# Patient Record
Sex: MALE | Race: WHITE | NOT HISPANIC OR LATINO | Employment: OTHER | URBAN - METROPOLITAN AREA
[De-identification: names, ages, dates, MRNs, and addresses within clinical notes are randomized per-mention and may not be internally consistent; named-entity substitution may affect disease eponyms.]

---

## 2021-04-22 ENCOUNTER — HOSPITAL ENCOUNTER (OUTPATIENT)
Dept: INTERNAL MEDICINE | Facility: CLINIC | Age: 56
Discharge: HOME | End: 2021-04-22
Attending: STUDENT IN AN ORGANIZED HEALTH CARE EDUCATION/TRAINING PROGRAM

## 2021-04-22 ENCOUNTER — OFFICE VISIT CONVERTED (OUTPATIENT)
Dept: INTERNAL MEDICINE | Facility: CLINIC | Age: 56
End: 2021-04-22
Attending: STUDENT IN AN ORGANIZED HEALTH CARE EDUCATION/TRAINING PROGRAM

## 2021-04-22 ENCOUNTER — CONVERSION ENCOUNTER (OUTPATIENT)
Dept: INTERNAL MEDICINE | Facility: CLINIC | Age: 56
End: 2021-04-22

## 2021-04-22 LAB
ALBUMIN SERPL-MCNC: 4.3 G/DL (ref 3.5–5)
ALBUMIN/GLOB SERPL: 1.3 {RATIO} (ref 1.4–2.6)
ALP SERPL-CCNC: 73 U/L (ref 56–119)
ALT SERPL-CCNC: 28 U/L (ref 10–40)
ANION GAP SERPL CALC-SCNC: 15 MMOL/L (ref 8–19)
AST SERPL-CCNC: 32 U/L (ref 15–50)
BASOPHILS # BLD AUTO: 0.04 10*3/UL (ref 0–0.2)
BASOPHILS NFR BLD AUTO: 0.7 % (ref 0–3)
BILIRUB SERPL-MCNC: 0.32 MG/DL (ref 0.2–1.3)
BUN SERPL-MCNC: 18 MG/DL (ref 5–25)
BUN/CREAT SERPL: 19 {RATIO} (ref 6–20)
CALCIUM SERPL-MCNC: 9.5 MG/DL (ref 8.7–10.4)
CHLORIDE SERPL-SCNC: 102 MMOL/L (ref 99–111)
CHOLEST SERPL-MCNC: 187 MG/DL (ref 107–200)
CHOLEST/HDLC SERPL: 4.2 {RATIO} (ref 3–6)
CONV ABS IMM GRAN: 0.01 10*3/UL (ref 0–0.2)
CONV CO2: 26 MMOL/L (ref 22–32)
CONV HIV-1/ HIV-2: NONREACTIVE
CONV IMMATURE GRAN: 0.2 % (ref 0–1.8)
CONV TOTAL PROTEIN: 7.7 G/DL (ref 6.3–8.2)
CREAT UR-MCNC: 0.95 MG/DL (ref 0.7–1.2)
DEPRECATED RDW RBC AUTO: 40.4 FL (ref 35.1–43.9)
EOSINOPHIL # BLD AUTO: 0.14 10*3/UL (ref 0–0.7)
EOSINOPHIL # BLD AUTO: 2.5 % (ref 0–7)
ERYTHROCYTE [DISTWIDTH] IN BLOOD BY AUTOMATED COUNT: 12.4 % (ref 11.6–14.4)
GFR SERPLBLD BASED ON 1.73 SQ M-ARVRAT: >60 ML/MIN/{1.73_M2}
GLOBULIN UR ELPH-MCNC: 3.4 G/DL (ref 2–3.5)
GLUCOSE SERPL-MCNC: 84 MG/DL (ref 70–99)
HCT VFR BLD AUTO: 45.6 % (ref 42–52)
HDLC SERPL-MCNC: 45 MG/DL (ref 40–60)
HGB BLD-MCNC: 15.3 G/DL (ref 14–18)
LDLC SERPL CALC-MCNC: 105 MG/DL (ref 70–100)
LYMPHOCYTES # BLD AUTO: 2.01 10*3/UL (ref 1–5)
LYMPHOCYTES NFR BLD AUTO: 35.6 % (ref 20–45)
MCH RBC QN AUTO: 30.5 PG (ref 27–31)
MCHC RBC AUTO-ENTMCNC: 33.6 G/DL (ref 33–37)
MCV RBC AUTO: 91 FL (ref 80–96)
MONOCYTES # BLD AUTO: 0.66 10*3/UL (ref 0.2–1.2)
MONOCYTES NFR BLD AUTO: 11.7 % (ref 3–10)
NEUTROPHILS # BLD AUTO: 2.78 10*3/UL (ref 2–8)
NEUTROPHILS NFR BLD AUTO: 49.3 % (ref 30–85)
NRBC CBCN: 0 % (ref 0–0.7)
OSMOLALITY SERPL CALC.SUM OF ELEC: 289 MOSM/KG (ref 273–304)
PLATELET # BLD AUTO: 255 10*3/UL (ref 130–400)
PMV BLD AUTO: 10.5 FL (ref 9.4–12.4)
POTASSIUM SERPL-SCNC: 4.4 MMOL/L (ref 3.5–5.3)
RBC # BLD AUTO: 5.01 10*6/UL (ref 4.7–6.1)
SODIUM SERPL-SCNC: 139 MMOL/L (ref 135–147)
TRIGL SERPL-MCNC: 186 MG/DL (ref 40–150)
TSH SERPL-ACNC: 2.71 M[IU]/L (ref 0.27–4.2)
VLDLC SERPL-MCNC: 37 MG/DL (ref 5–37)
WBC # BLD AUTO: 5.64 10*3/UL (ref 4.8–10.8)

## 2021-05-11 NOTE — H&P
"   History and Physical      Patient Name: Nick Resendez   Patient ID: [id number]   Sex: Male   YOB: 1965    Primary Care Provider: Santiago Blanchard MD    Visit Date: April 22, 2021    Provider: Santiago Blanchard MD   Location: [facility]   Location Address: [address] [place] [address]; [address]  [place], [place]  [address]   Location Phone: [phone]          Chief Complaint  · EST CARE  · \"overall health\"  · \"skin condition\"      History Of Present Illness  Nick Resendez is a 55 year old /White male who presents for evaluation and treatment of:      Here to establish care.  Previous PCP- Dr. Augustin Magallon ([facility] in [facility])    History of skin condition, for which he follows with a Dr. Sharma of dermatology in [place].  On doxycycline and an unknown topical medicine (starting with a C).  He reports he has been diagnosed with folliculitis.    Medical history otherwise notable for tobacco abuse.  Smokes 1 PPD (started age 18).  He reports he did quit smoking for 16 years, but began again a few years ago.  He has experimented with vaped marijuana, and occasionally consumes alcohol.  He denies other illicits.    Medical history otherwise notable for missing part of right middle finger (childhood accident), hearing loss (wears a hearing aid, left ear), and a heart valve problem (for which he previously saw Dr. Carrion).      No known history of MI, stents, CVA or cancer    Health Maintenance:  Immunizations: has not had flu or COVID immunizations, has not had shingles shot (has had chickenpox as a child)  Colon: has never had colon cancer screening         Allergy List    Allergies Reconciled  Review of Systems  · Constitutional  o Denies  o : fever, fatigue, weight loss, weight gain  · Cardiovascular  o Denies  o : lower extremity edema, claudication, chest pressure, palpitations  · Respiratory  o Denies  o : shortness of breath, wheezing, " "cough, hemoptysis, dyspnea on exertion  · Gastrointestinal  o Denies  o : nausea, vomiting, diarrhea, constipation, abdominal pain      Vitals  Date Time BP Position Site L\R Cuff Size HR RR TEMP (F) WT  HT  BMI kg/m2 BSA m2 O2 Sat FR L/min FiO2 HC       04/22/2021 04:09 /85 Sitting    67 - R   200lbs 0oz 5'  11\" 27.89 2.13 98 %  21%          Physical Examination  · Constitutional  o Appearance  o : no acute distress, well-nourished  · Head and Face  o Head  o :   § Inspection  § : atraumatic, normocephalic  · Eyes  o Eyes  o : extraocular movements intact, no scleral icterus, no conjunctival injection  · Ears, Nose, Mouth and Throat  o Ears  o :   § External Ears  § : normal  § Otoscopic Examination  § : tympanic membrane appearance within normal limits bilaterally, superficial abrasion noted right ear canal  o Nose  o :   § Intranasal Exam  § : nares patent  o Oral Cavity  o :   § Oral Mucosa  § : moist mucous membranes  · Respiratory  o Respiratory Effort  o : breathing comfortably, symmetric chest rise  o Auscultation of Lungs  o : clear to asculatation bilaterally, no wheezes, rales, or rhonchii  · Cardiovascular  o Heart  o :   § Auscultation of Heart  § : regular rate and rhythm, no murmurs, rubs, or gallops  o Peripheral Vascular System  o :   § Extremities  § : no edema  · Gastrointestinal  o Abdominal Examination  o :   § Abdomen  § : non-distended, non-tender  · Skin and Subcutaneous Tissue  o General Inspection  o : superficial ulcerations and an excoriation noted on scalp. Dry skin noted on forearms  · Neurologic  o Mental Status Examination  o :   § Orientation  § : grossly oriented to person, place and time  · Psychiatric  o General  o : normal mood and affect     MSK: missing distal aspect of right middle finger           Assessment  · Screening for colon cancer     V76.51/Z12.11  · Screening for HIV (human immunodeficiency virus)     V73.89/Z11.4  · Annual physical exam     V70.0/Z00.00  -labs " today  -will order colonoscopy  -will request records from derm and previous PCP  -will start nicotine gum plus patches for smoking cessation  -discussed the importance of a quit date, noting usual patterns and triggers so as to form coping strategies ahead of time  -counseled that doxycycline can cause photosensitivity (wear a hat, use sunscreen when outdoors) and the importance of swallowing with plenty of liquids  · Nicotine dependence     305.1/F17.200  · Tobacco abuse counseling       Tobacco abuse counseling     V65.42/Z71.6  · Rash     782.1/R21  · Hearing loss     389.9/H91.90  -hearing aid, left ear  · Heart valve problem     424.90/I38  -no murmur on exam today, hemodynamically stable  -will request records from his cardiologist    Problems Reconciled  Plan  · Orders  o COLONOSCOPY REFERRAL (COLON) - V76.51/Z12.11 - 04/22/2021  o HIV Screen by EIA [facility] ([id number], ) - V73.89/Z11.4 - 04/22/2021  o Physical, Primary Care Panel (CBC, CMP, Lipid, TSH) [facility] ([id number], [id number], [id number], [id number]) - V70.0/Z00.00 - 04/22/2021  o Smoking cessation counseling, 3-10 minutes [facility] ([id number]) - 305.1/F17.200 - 04/22/2021  o ACO-17: Screened for tobacco use AND received tobacco cessation intervention (4004F) - 305.1/F17.200 - 04/22/2021  o ACO-17: Screened for tobacco use AND received tobacco cessation intervention (4004F) - V65.42/Z71.6 - 04/22/2021  o ACO-39: Current medications updated and reviewed (, 1159F) - V70.0/Z00.00, V76.51/Z12.11, V73.89/Z11.4, 305.1/F17.200 - 04/22/2021  · Medications  o Medications have been Reconciled  o Transition of Care or Provider Policy  · Instructions  o CDC recommends that everyone between 13 and 64 get tested for HIV at least once as part of routine healthcare.  o Reviewed health maintenance flowsheet and updated information. Orders were placed and/or patient's response was documented.  o *Form of nicotine being used:   o Patient was strongly encouraged to discontinue use of any nicotine  containing product or minimize the use of the product.  o Tobacco and smoking cessation counseling for more than 3 minutes was completed.  o Patient was educated/instructed on their diagnosis, treatment and medications prior to discharge from the clinic today.  · Disposition  o Return Visit Request in/on 3 months +/- 2 days ([id number]).            Electronically Signed by: Santiago Blanchard MD -Author on April 22, 2021 05:23:19 PM

## 2021-05-14 VITALS
HEIGHT: 71 IN | SYSTOLIC BLOOD PRESSURE: 144 MMHG | WEIGHT: 200 LBS | BODY MASS INDEX: 28 KG/M2 | OXYGEN SATURATION: 98 % | HEART RATE: 67 BPM | DIASTOLIC BLOOD PRESSURE: 85 MMHG

## 2021-06-10 ENCOUNTER — TELEPHONE (OUTPATIENT)
Dept: INTERNAL MEDICINE | Facility: CLINIC | Age: 56
End: 2021-06-10

## 2021-06-16 DIAGNOSIS — R52 PAIN: Primary | ICD-10-CM

## 2021-06-25 ENCOUNTER — TELEPHONE (OUTPATIENT)
Dept: INTERNAL MEDICINE | Facility: CLINIC | Age: 56
End: 2021-06-25

## 2021-06-25 NOTE — TELEPHONE ENCOUNTER
Attempted phone contact with Mr. Resendez as he is requesting referral to pain management.  No answer, no voice mail set up.    Attempting to get in touch to inquire as to what sorts of chronic pain needs he is having.

## 2022-03-18 ENCOUNTER — TELEPHONE (OUTPATIENT)
Dept: GASTROENTEROLOGY | Facility: CLINIC | Age: 57
End: 2022-03-18

## 2022-03-18 NOTE — TELEPHONE ENCOUNTER
Dali from [facility] called at 10:14 checking on a referral for an appointment at the  [facility]. She sent over the referral. She would like a call back at [phone].

## 2022-03-22 NOTE — TELEPHONE ENCOUNTER
I left a message on Dali's voicemail letting her know that I have reached out to the patient twice but he has not yet been scheduled.

## 2022-05-06 ENCOUNTER — TELEPHONE (OUTPATIENT)
Dept: GASTROENTEROLOGY | Facility: CLINIC | Age: 57
End: 2022-05-06

## 2022-05-06 ENCOUNTER — OFFICE VISIT (OUTPATIENT)
Dept: GASTROENTEROLOGY | Facility: HOSPITAL | Age: 57
End: 2022-05-06

## 2022-05-06 VITALS
BODY MASS INDEX: 27.3 KG/M2 | SYSTOLIC BLOOD PRESSURE: 156 MMHG | HEART RATE: 95 BPM | DIASTOLIC BLOOD PRESSURE: 89 MMHG | WEIGHT: 195 LBS | HEIGHT: 71 IN

## 2022-05-06 DIAGNOSIS — Z11.4 ENCOUNTER FOR SCREENING FOR HUMAN IMMUNODEFICIENCY VIRUS (HIV): ICD-10-CM

## 2022-05-06 DIAGNOSIS — Z11.59 ENCOUNTER FOR SCREENING FOR OTHER VIRAL DISEASES: ICD-10-CM

## 2022-05-06 DIAGNOSIS — Z03.89 ENCOUNTER FOR OBSERVATION FOR OTHER SUSPECTED DISEASES AND CONDITIONS RULED OUT: ICD-10-CM

## 2022-05-06 DIAGNOSIS — B18.2 CHRONIC HEPATITIS C WITHOUT HEPATIC COMA: Primary | ICD-10-CM

## 2022-05-06 LAB
ALBUMIN SERPL-MCNC: 5.1 G/DL (ref 3.5–5.2)
ALBUMIN/GLOB SERPL: 1.6 G/DL
ALP SERPL-CCNC: 88 U/L (ref 39–117)
ALT SERPL W P-5'-P-CCNC: 28 U/L (ref 1–41)
ANION GAP SERPL CALCULATED.3IONS-SCNC: 14.2 MMOL/L (ref 5–15)
AST SERPL-CCNC: 31 U/L (ref 1–40)
BASOPHILS # BLD AUTO: 0.06 10*3/MM3 (ref 0–0.2)
BASOPHILS NFR BLD AUTO: 0.8 % (ref 0–1.5)
BILIRUB SERPL-MCNC: 0.3 MG/DL (ref 0–1.2)
BUN SERPL-MCNC: 14 MG/DL (ref 6–20)
BUN/CREAT SERPL: 12.8 (ref 7–25)
CALCIUM SPEC-SCNC: 10.1 MG/DL (ref 8.6–10.5)
CHLORIDE SERPL-SCNC: 99 MMOL/L (ref 98–107)
CO2 SERPL-SCNC: 23.8 MMOL/L (ref 22–29)
CREAT SERPL-MCNC: 1.09 MG/DL (ref 0.76–1.27)
DEPRECATED RDW RBC AUTO: 38.9 FL (ref 37–54)
EGFRCR SERPLBLD CKD-EPI 2021: 80.7 ML/MIN/1.73
EOSINOPHIL # BLD AUTO: 0.16 10*3/MM3 (ref 0–0.4)
EOSINOPHIL NFR BLD AUTO: 2.1 % (ref 0.3–6.2)
ERYTHROCYTE [DISTWIDTH] IN BLOOD BY AUTOMATED COUNT: 12.1 % (ref 12.3–15.4)
ETHANOL BLD-MCNC: <10 MG/DL (ref 0–10)
ETHANOL UR QL: <0.01 %
GLOBULIN UR ELPH-MCNC: 3.2 GM/DL
GLUCOSE SERPL-MCNC: 83 MG/DL (ref 65–99)
HAV IGM SERPL QL IA: ABNORMAL
HBV CORE IGM SERPL QL IA: ABNORMAL
HBV SURFACE AB SER RIA-ACNC: NORMAL
HBV SURFACE AG SERPL QL IA: ABNORMAL
HCT VFR BLD AUTO: 47.6 % (ref 37.5–51)
HCV AB SER DONR QL: REACTIVE
HGB BLD-MCNC: 16.5 G/DL (ref 13–17.7)
HIV1+2 AB SER QL: NORMAL
IMM GRANULOCYTES # BLD AUTO: 0.02 10*3/MM3 (ref 0–0.05)
IMM GRANULOCYTES NFR BLD AUTO: 0.3 % (ref 0–0.5)
INR PPP: 0.86 (ref 0.86–1.15)
LYMPHOCYTES # BLD AUTO: 2.58 10*3/MM3 (ref 0.7–3.1)
LYMPHOCYTES NFR BLD AUTO: 34.5 % (ref 19.6–45.3)
MCH RBC QN AUTO: 30.7 PG (ref 26.6–33)
MCHC RBC AUTO-ENTMCNC: 34.7 G/DL (ref 31.5–35.7)
MCV RBC AUTO: 88.5 FL (ref 79–97)
MONOCYTES # BLD AUTO: 0.67 10*3/MM3 (ref 0.1–0.9)
MONOCYTES NFR BLD AUTO: 9 % (ref 5–12)
NEUTROPHILS NFR BLD AUTO: 3.99 10*3/MM3 (ref 1.7–7)
NEUTROPHILS NFR BLD AUTO: 53.3 % (ref 42.7–76)
NRBC BLD AUTO-RTO: 0 /100 WBC (ref 0–0.2)
PLATELET # BLD AUTO: 273 10*3/MM3 (ref 140–450)
PMV BLD AUTO: 10.7 FL (ref 6–12)
POTASSIUM SERPL-SCNC: 4 MMOL/L (ref 3.5–5.2)
PROT SERPL-MCNC: 8.3 G/DL (ref 6–8.5)
PROTHROMBIN TIME: 11.8 SECONDS (ref 11.8–14.9)
RBC # BLD AUTO: 5.38 10*6/MM3 (ref 4.14–5.8)
SODIUM SERPL-SCNC: 137 MMOL/L (ref 136–145)
WBC NRBC COR # BLD: 7.48 10*3/MM3 (ref 3.4–10.8)

## 2022-05-06 PROCEDURE — 80307 DRUG TEST PRSMV CHEM ANLYZR: CPT | Performed by: NURSE PRACTITIONER

## 2022-05-06 PROCEDURE — 82077 ASSAY SPEC XCP UR&BREATH IA: CPT | Performed by: NURSE PRACTITIONER

## 2022-05-06 PROCEDURE — 87522 HEPATITIS C REVRS TRNSCRPJ: CPT | Performed by: NURSE PRACTITIONER

## 2022-05-06 PROCEDURE — 99204 OFFICE O/P NEW MOD 45 MIN: CPT | Performed by: NURSE PRACTITIONER

## 2022-05-06 PROCEDURE — 86704 HEP B CORE ANTIBODY TOTAL: CPT | Performed by: NURSE PRACTITIONER

## 2022-05-06 PROCEDURE — 85610 PROTHROMBIN TIME: CPT | Performed by: NURSE PRACTITIONER

## 2022-05-06 PROCEDURE — 91200 LIVER ELASTOGRAPHY: CPT | Performed by: NURSE PRACTITIONER

## 2022-05-06 PROCEDURE — 80074 ACUTE HEPATITIS PANEL: CPT | Performed by: NURSE PRACTITIONER

## 2022-05-06 PROCEDURE — G0480 DRUG TEST DEF 1-7 CLASSES: HCPCS | Performed by: NURSE PRACTITIONER

## 2022-05-06 PROCEDURE — G0463 HOSPITAL OUTPT CLINIC VISIT: HCPCS | Performed by: NURSE PRACTITIONER

## 2022-05-06 PROCEDURE — 87517 HEPATITIS B DNA QUANT: CPT | Performed by: NURSE PRACTITIONER

## 2022-05-06 PROCEDURE — 80053 COMPREHEN METABOLIC PANEL: CPT | Performed by: NURSE PRACTITIONER

## 2022-05-06 PROCEDURE — 87902 NFCT AGT GNTYP ALYS HEP C: CPT | Performed by: NURSE PRACTITIONER

## 2022-05-06 PROCEDURE — 85025 COMPLETE CBC W/AUTO DIFF WBC: CPT | Performed by: NURSE PRACTITIONER

## 2022-05-06 PROCEDURE — G0432 EIA HIV-1/HIV-2 SCREEN: HCPCS | Performed by: NURSE PRACTITIONER

## 2022-05-06 PROCEDURE — 86706 HEP B SURFACE ANTIBODY: CPT | Performed by: NURSE PRACTITIONER

## 2022-05-06 RX ORDER — CETIRIZINE HYDROCHLORIDE 10 MG/1
10 TABLET ORAL DAILY
COMMUNITY

## 2022-05-06 RX ORDER — BIOTIN 1 MG
1000 TABLET ORAL 3 TIMES DAILY
COMMUNITY

## 2022-05-06 RX ORDER — KETOCONAZOLE 20 MG/G
CREAM TOPICAL
COMMUNITY
Start: 2022-04-12

## 2022-05-06 RX ORDER — LISINOPRIL 40 MG/1
40 TABLET ORAL DAILY
COMMUNITY
Start: 2022-04-30

## 2022-05-06 RX ORDER — DOXYCYCLINE 50 MG/1
CAPSULE ORAL
COMMUNITY
End: 2022-12-20

## 2022-05-06 RX ORDER — MELOXICAM 15 MG/1
TABLET ORAL
COMMUNITY
Start: 2022-04-22

## 2022-05-06 RX ORDER — ERGOCALCIFEROL 1.25 MG/1
50000 CAPSULE ORAL
COMMUNITY
Start: 2022-03-11 | End: 2023-03-12

## 2022-05-06 RX ORDER — FUROSEMIDE 20 MG/1
20 TABLET ORAL DAILY
COMMUNITY
Start: 2022-04-30

## 2022-05-06 RX ORDER — FLUOXETINE HYDROCHLORIDE 20 MG/1
CAPSULE ORAL
COMMUNITY
Start: 2022-04-30 | End: 2022-12-20

## 2022-05-06 RX ORDER — UBIDECARENONE 75 MG
100 CAPSULE ORAL DAILY
COMMUNITY

## 2022-05-06 NOTE — PROGRESS NOTES
Chief Complaint  Hepatitis C    Subjective            Nick Resendez presents to [facility] for evaluation and treatment of chronic HCV.      HPI    He reports recent diagnosis of Hepatitis C.  Admits having one unprofessional tattoo.  Reports a history of illicit drug use.  States that he last used about one year ago.      Allergies   Allergen Reactions   • Azithromycin Nausea Only   • Sulfa Antibiotics Nausea Only       Current Outpatient Medications:   •  ascorbic acid (VITAMIN C) 1000 MG tablet, Take 1,000 mg by mouth Daily., Disp: , Rfl:   •  Biotin 1000 MCG tablet, Take 1,000 mcg by mouth 3 (Three) Times a Day., Disp: , Rfl:   •  cetirizine (zyrTEC) 10 MG tablet, Take 10 mg by mouth Daily., Disp: , Rfl:   •  doxycycline (MONODOX) 50 MG capsule, doxycycline monohydrate 50 mg oral capsule take 2 capsules (100 mg) by oral route once daily   Active, Disp: , Rfl:   •  ergocalciferol (ERGOCALCIFEROL) 1.25 MG (63857 UT) capsule, Take 50,000 Units by mouth., Disp: , Rfl:   •  FLUoxetine (PROzac) 20 MG capsule, , Disp: , Rfl:   •  furosemide (LASIX) 20 MG tablet, , Disp: , Rfl:   •  ketoconazole (NIZORAL) 2 % cream, SMARTSI Application Topical 1 to 2 Times Daily, Disp: , Rfl:   •  lisinopril (PRINIVIL,ZESTRIL) 40 MG tablet, , Disp: , Rfl:   •  meloxicam (MOBIC) 15 MG tablet, , Disp: , Rfl:   •  nicotine polacrilex (NICORETTE) 2 MG gum, nicotine (polacrilex) 2 mg buccal gum chew 1 piece of gum (2 mg) by oral route every 2 hours as needed and as directed for 30 days 2021  Active, Disp: , Rfl:   •  vitamin B-12 (CYANOCOBALAMIN) 100 MCG tablet, Take 100 mcg by mouth Daily., Disp: , Rfl:   History reviewed. No pertinent past medical history.  History reviewed. No pertinent surgical history.  Social History     Socioeconomic History   • Marital status:    Tobacco Use   • Smoking status: Current Every Day Smoker   • Tobacco comment: 12 per day       Review of Systems      Objective     Vitals:    05/06/22 1318   BP: 156/89   Pulse: 95     Body mass index is 27.2 kg/m².  Body surface area is 2.09 meters squared.    Physical Exam  Constitutional:       General: He is not in acute distress.     Appearance: Normal appearance. He is well-developed and normal weight.   HENT:      Head: Normocephalic and atraumatic.   Eyes:      Conjunctiva/sclera: Conjunctivae normal.      Pupils: Pupils are equal, round, and reactive to light.      Visual Fields: Right eye visual fields normal and left eye visual fields normal.   Cardiovascular:      Rate and Rhythm: Normal rate and regular rhythm.      Heart sounds: Normal heart sounds.   Pulmonary:      Effort: Pulmonary effort is normal. No retractions.      Breath sounds: Normal breath sounds and air entry.   Abdominal:      General: Bowel sounds are normal.      Palpations: Abdomen is soft.      Tenderness: There is no abdominal tenderness.      Comments: No appreciable hepatosplenomegaly or ascites   Musculoskeletal:         General: Normal range of motion.      Cervical back: Neck supple.      Right lower leg: No edema.      Left lower leg: No edema.   Lymphadenopathy:      Cervical: No cervical adenopathy.   Skin:     General: Skin is warm and dry.      Findings: No lesion.   Neurological:      General: No focal deficit present.      Mental Status: He is alert and oriented to person, place, and time.   Psychiatric:         Mood and Affect: Mood and affect normal.         Behavior: Behavior normal.         Result Review :     The following data was reviewed by: ANTON Jeffrey on 05/06/2022:    Jazmine Stiffness Consistent with: F0/F1  CAP Score: S3              Liver Elastography  Performed by: Melody Cassidy APRN  Authorized by: Melody Cassidy APRN   Ordering Provider: Melody Cassidy APRN    Probe:  M+  Procedure Details:  Procedure: After providing an oral and written explanation of the Fibroscan vibration  controlled transient elastography (VTCE) test procedure to the patient. The patient was placed in supine position with right arm in maximum abduction to allow optimal exposure of right lateral abdomen. Patient was briefly assessed, identifying terminus of the xyphoid process and locating an ideal transient elastography testing site, midline and lateral to this point. Patient was instructed to breathe normally and remain stationary during the test process. Pre-measurement data confirmed the transient elastography probe was centered over the liver parenchyma. A series of ten 50 Hz mechanical pulses were applied with controlled application pressure to induce a mechanical shear wave in the liver tissue. For each measurement, the shear wave propagation speed was detected, displayed and converted to its equivalent liver stiffness value in kilopascals. Skin to liver capsules distance and shear wave characteristics were monitored during the entire examination to assure quality data. Median liver stiffness measurement and interquartile range were calculated and displayed in real time. Acquired measurement data was stored and submitted to the provider for review and interpretation. Patient tolerated the procedure well and was discharged without incident.   Clinical Information:     NPO 3 Hours or More: Yes      Actively Drinking: No    Findings:     Median Liver Stiffness Score:  6.5    Interquartile Range (IQR) to Median Ratio:  17    Interpretation:  Taking into account the patient's history and recent liver test, this Liver Stiffness Score is consistent with below scale:    Jazmine Stiffness Consistent with:  F0-F1    Current Scan Considered Reliab: Yes      Median Controlled Attenuation Parameter (dB/m):  346    IQR:  14    Liver Fat Interpretation:  Taking into account the patient's history, this CAP score is consistent with below scale:      CAP SCORE:  Moderate/severe liver fat                 Assessment:    Diagnoses and  all orders for this visit:    1. Chronic hepatitis C without hepatic coma (HCC) (Primary)  -     Liver Elastography  -     Hepatitis Panel, Acute  -     Ethanol  -     CBC Auto Differential  -     Comprehensive Metabolic Panel  -     Drug Screen 10 With / Conf, WB  -     HCV FibroSURE  -     HCV RNA Qn (Graph) Rfx NS3 / 4A  -     US Abdomen Limited; Future  -     Hepatitis B DNA, Quantitative, PCR  -     HCV Genotyping (PCR) with 1A Subtype Reflex Drug Resist  -     Hepatitis B Core Antibody, Total  -     Protime-INR  -     HIV-1 & HIV-2 Antibodies  -     Hepatitis B Surface Antibody    2. Encounter for observation for other suspected diseases and conditions ruled out   -     Drug Screen 10 With / Conf, WB    3. Encounter for screening for other viral diseases   -     Hepatitis B Core Antibody, Total  -     Hepatitis B Surface Antibody    4. Encounter for screening for human immunodeficiency virus (HIV)   -     HIV-1 & HIV-2 Antibodies         Plan:   Labs today to determine genotype and treatment plan.      Patient Instructions: Avoid Alcohol, Avoid Illicit Drug Use, Importance of keeping appointments.  Patient Education Provided: Yes      Follow Up   Return in about 4 weeks (around 6/3/2022) for Hepatitis C.  Patient was given instructions and counseling regarding his condition or for health maintenance advice. Please see specific information pulled into the AVS if appropriate.     Melody Cassidy, APRN  05/06/2022

## 2022-05-07 LAB — HBV CORE AB SERPL QL IA: NEGATIVE

## 2022-05-08 LAB
HBV DNA SERPL NAA+PROBE-ACNC: NORMAL IU/ML
HBV DNA SERPL NAA+PROBE-LOG IU: NORMAL {LOG_IU}/ML
REF LAB TEST REF RANGE: NORMAL

## 2022-05-10 LAB
A2 MACROGLOB SERPL-MCNC: 238 MG/DL (ref 110–276)
ALT SERPL W P-5'-P-CCNC: 27 IU/L (ref 0–55)
APO A-I SERPL-MCNC: 135 MG/DL (ref 101–178)
BILIRUB SERPL-MCNC: 0.2 MG/DL (ref 0–1.2)
FIBROSIS SCORING:: ABNORMAL
FIBROSIS STAGE SERPL QL: ABNORMAL
GGT SERPL-CCNC: 36 IU/L (ref 0–65)
HAPTOGLOB SERPL-MCNC: 112 MG/DL (ref 29–370)
HCV AB SER QL: ABNORMAL
LABORATORY COMMENT REPORT: ABNORMAL
LIVER FIBR SCORE SERPL CALC.FIBROSURE: 0.24 (ref 0–0.21)
NECROINFLAMM ACTIVITY SCORING:: ABNORMAL
NECROINFLAMMATORY ACT GRADE SERPL QL: ABNORMAL
NECROINFLAMMATORY ACT SCORE SERPL: 0.12 (ref 0–0.17)
SERVICE CMNT-IMP: ABNORMAL

## 2022-05-12 ENCOUNTER — TELEPHONE (OUTPATIENT)
Dept: GASTROENTEROLOGY | Facility: CLINIC | Age: 57
End: 2022-05-12

## 2022-05-12 LAB
HCV GENTYP SERPL NAA+PROBE: NORMAL
HCV RNA SERPL NAA+PROBE-ACNC: NORMAL IU/ML
HCV RNA SERPL NAA+PROBE-LOG IU: NORMAL {LOG_IU}/ML
REF LAB TEST REF RANGE: NORMAL

## 2022-05-12 NOTE — TELEPHONE ENCOUNTER
----- Message from ANTON Jeffrey sent at 5/12/2022 12:07 PM EDT -----  Please let patient know that no active hep C was detected.  He does not require treatment or follow-up.  Please cancel his upcoming follow-up.

## 2022-05-13 ENCOUNTER — TELEPHONE (OUTPATIENT)
Dept: GASTROENTEROLOGY | Facility: CLINIC | Age: 57
End: 2022-05-13

## 2022-05-13 NOTE — TELEPHONE ENCOUNTER
----- Message from ANTON Jeffrey sent at 5/9/2022  9:58 AM EDT -----  Will need Hep B vaccine series.  This can be given during treatment.

## 2022-05-18 LAB
AMPHET SERPLBLD CFM-MCNC: 52 NG/ML
AMPHETAMINES BLD QL SCN: ABNORMAL NG/ML
AMPHETAMINES SPEC QL: POSITIVE
BARBITURATES SERPLBLD QL: NEGATIVE UG/ML
BENZODIAZ BLD QL: NEGATIVE NG/ML
CANNABINOIDS BLD QL SCN: NEGATIVE NG/ML
COCAINE+BZE SERPLBLD QL SCN: NEGATIVE NG/ML
MDA SERPLBLD-MCNC: NEGATIVE NG/ML
MDEA SERPLBLD CFM-MCNC: NEGATIVE NG/ML
MDMA SERPLBLD-MCNC: NEGATIVE NG/ML
METHADONE BLD QL SCN: NEGATIVE NG/ML
METHAMPHET SERPLBLD CFM-MCNC: >1500 NG/ML
OPIATES BLD QL SCN: NEGATIVE NG/ML
OXYCODONE+OXYMORPHONE SERPLBLD QL SCN: NEGATIVE NG/ML
PCP BLD QL SCN: NEGATIVE NG/ML
PROPOXYPH BLD QL SCN: NEGATIVE NG/ML

## 2022-05-28 LAB
HCV GENTYP SERPL NAA+PROBE: NORMAL
LABORATORY COMMENT REPORT: NORMAL
REFLEX TEST INFORMATION: NORMAL

## 2022-08-04 ENCOUNTER — TELEPHONE (OUTPATIENT)
Dept: GASTROENTEROLOGY | Facility: CLINIC | Age: 57
End: 2022-08-04

## 2022-10-10 ENCOUNTER — TELEPHONE (OUTPATIENT)
Dept: SURGERY | Facility: CLINIC | Age: 57
End: 2022-10-10

## 2022-10-10 NOTE — TELEPHONE ENCOUNTER
LAYNEM FOR [facility]URON'S REFERRAL CLERK TO INFORM PT CX NEW PT APPT WITH DR. FINNEGAN FROM 10/10/MS

## 2022-10-27 ENCOUNTER — OFFICE VISIT (OUTPATIENT)
Dept: SURGERY | Facility: CLINIC | Age: 57
End: 2022-10-27

## 2022-10-27 ENCOUNTER — PREP FOR SURGERY (OUTPATIENT)
Dept: OTHER | Facility: HOSPITAL | Age: 57
End: 2022-10-27

## 2022-10-27 VITALS — WEIGHT: 199.4 LBS | BODY MASS INDEX: 27.92 KG/M2 | HEIGHT: 71 IN | RESPIRATION RATE: 14 BRPM

## 2022-10-27 DIAGNOSIS — K43.2 INCISIONAL HERNIA, WITHOUT OBSTRUCTION OR GANGRENE: Primary | ICD-10-CM

## 2022-10-27 PROCEDURE — 99203 OFFICE O/P NEW LOW 30 MIN: CPT | Performed by: SURGERY

## 2022-10-27 PROCEDURE — 99406 BEHAV CHNG SMOKING 3-10 MIN: CPT | Performed by: SURGERY

## 2022-10-27 RX ORDER — NICOTINE 21 MG/24HR
1 PATCH, TRANSDERMAL 24 HOURS TRANSDERMAL EVERY 24 HOURS
Qty: 30 PATCH | Refills: 0 | Status: ON HOLD | OUTPATIENT
Start: 2022-10-27 | End: 2023-02-10

## 2022-10-27 RX ORDER — BUPROPION HYDROCHLORIDE 150 MG/1
150 TABLET, EXTENDED RELEASE ORAL
COMMUNITY
Start: 2022-10-03 | End: 2023-02-01

## 2022-10-27 RX ORDER — PROPRANOLOL HCL 60 MG
60 CAPSULE, EXTENDED RELEASE 24HR ORAL DAILY
Qty: 30 CAPSULE | Refills: 11 | COMMUNITY
Start: 2022-10-03 | End: 2022-12-20

## 2022-10-27 RX ORDER — CEFAZOLIN SODIUM 2 G/100ML
2 INJECTION, SOLUTION INTRAVENOUS ONCE
Status: CANCELLED | OUTPATIENT
Start: 2022-10-27 | End: 2022-10-27

## 2022-10-27 RX ORDER — MINOCYCLINE HYDROCHLORIDE 100 MG/1
CAPSULE ORAL
COMMUNITY
Start: 2022-10-03

## 2022-10-27 RX ORDER — SODIUM CHLORIDE, SODIUM LACTATE, POTASSIUM CHLORIDE, CALCIUM CHLORIDE 600; 310; 30; 20 MG/100ML; MG/100ML; MG/100ML; MG/100ML
50 INJECTION, SOLUTION INTRAVENOUS CONTINUOUS
Status: CANCELLED | OUTPATIENT
Start: 2022-10-27

## 2022-10-31 ENCOUNTER — PREP FOR SURGERY (OUTPATIENT)
Dept: OTHER | Facility: HOSPITAL | Age: 57
End: 2022-10-31

## 2022-10-31 NOTE — PROGRESS NOTES
General Surgery/Colorectal Surgery Note    Patient Name:  Nick Resendez  YOB: 1965  [id number]    Referring Provider: Fatmata Ramirez,*      Patient Care Team:  Santiago Betancourt MD as PCP - General (Internal Medicine)  Preet Cruz MD as Consulting Physician (General Surgery)    Chief complaint hernia    Subjective .     History of present illness:    History of umbilical hernia repair in the past.  1-1/2-year history of a bulge to his umbilicus with increased size and pain worse with exertion.  No alleviating factors.  No incarceration or strangulation.  No imaging.  No blood thinner use.  No chest pain.  Positive tobacco abuse.      History:  History reviewed. No pertinent past medical history.    Past Surgical History:   Procedure Laterality Date   • HAND SURGERY     • INGUINAL HERNIA REPAIR     • UMBILICAL HERNIA REPAIR         History reviewed. No pertinent family history.    Social History     Tobacco Use   • Smoking status: Every Day     Packs/day: 1.00     Types: Cigarettes   • Smokeless tobacco: Never   • Tobacco comments:     12 per day   Vaping Use   • Vaping Use: Some days   • Substances: Nicotine   • Devices: Disposable       Review of Systems  All systems were reviewed and negative except for:   Review of Systems   Constitutional: Negative for chills, fever and unexpected weight loss.   HENT: Negative for congestion, nosebleeds and voice change.    Eyes: Negative for blurred vision, double vision and discharge.   Respiratory: Negative for apnea, chest tightness and shortness of breath.    Cardiovascular: Negative for chest pain and leg swelling.   Gastrointestinal:        See HPI   Endocrine: Negative for cold intolerance and heat intolerance.   Genitourinary: Negative for dysuria, hematuria and urgency.   Musculoskeletal: Negative for back pain, joint swelling and neck pain.   Skin: Negative for color change and dry skin.   Neurological: Negative for dizziness and  confusion.   Hematological: Negative for adenopathy.   Psychiatric/Behavioral: Negative for agitation and behavioral problems.     MEDS:  Prior to Admission medications    Medication Sig Start Date End Date Taking? Authorizing Provider   ascorbic acid (VITAMIN C) 1000 MG tablet Take 1,000 mg by mouth Daily.   Yes Marjorie Yoo MD   Biotin 1000 MCG tablet Take 1,000 mcg by mouth 3 (Three) Times a Day.   Yes Marjorie Yoo MD   buPROPion SR (WELLBUTRIN SR) 150 MG 12 hr tablet Take 1 tablet by mouth. 10/3/22 2/1/23 Yes Marjorie Yoo MD   cetirizine (zyrTEC) 10 MG tablet Take 10 mg by mouth Daily.   Yes ProviderMarjorie MD   ergocalciferol (ERGOCALCIFEROL) 1.25 MG (42758 UT) capsule Take 50,000 Units by mouth. 3/11/22 3/12/23 Yes ProviderMarjorie MD   furosemide (LASIX) 20 MG tablet  22  Yes Provider, MD Marjorie   ketoconazole (NIZORAL) 2 % cream SMARTSI Application Topical 1 to 2 Times Daily 22  Yes ProviderMarjorie MD   lisinopril (PRINIVIL,ZESTRIL) 40 MG tablet  22  Yes ProviderMarjorie MD   meloxicam (MOBIC) 15 MG tablet  22  Yes ProviderMarjorie MD   minocycline (MINOCIN,DYNACIN) 100 MG capsule  10/3/22  Yes Provider, MD Marjorie   nicotine polacrilex (NICORETTE) 2 MG gum nicotine (polacrilex) 2 mg buccal gum chew 1 piece of gum (2 mg) by oral route every 2 hours as needed and as directed for 30 days 2021  Active 21  Yes ProviderMarjorie MD   propranolol LA (INDERAL LA) 60 MG 24 hr capsule Take 1 capsule by mouth Daily. 10/3/22 10/4/23 Yes ProviderMarjorie MD   vitamin B-12 (CYANOCOBALAMIN) 100 MCG tablet Take 100 mcg by mouth Daily.   Yes ProviderMarjorie MD   doxycycline (MONODOX) 50 MG capsule doxycycline monohydrate 50 mg oral capsule take 2 capsules (100 mg) by oral route once daily   Active    ProviderMarjorie MD   FLUoxetine (PROzac) 20 MG capsule  22   Marjorie Yoo MD   nicotine  "(NICODERM CQ) 21 MG/24HR patch Place 1 patch on the skin as directed by provider Daily. 10/27/22   Preet Cruz MD        Allergies:  Azithromycin and Sulfa antibiotics    Objective     Vital Signs        Physical Exam:     General Appearance:    Alert, cooperative, in no acute distress   Head:    Normocephalic, without obvious abnormality, atraumatic   Eyes:          Conjunctivae and sclerae normal, no icterus,     Ears:    Ears appear intact with no abnormalities noted   Throat:   No oral lesions, no thrush, oral mucosa moist   Neck:   No adenopathy, supple, trachea midline, no thyromegaly   Back:     No kyphosis present, no scoliosis present, no skin lesions,      erythema or scars, no tenderness to percussion or                   palpation,   range of motion normal   Lungs:     Clear to auscultation,respirations regular, even and                  unlabored    Heart:    Regular rhythm and normal rate, normal S1 and S2, no            murmur, no gallop, no rub, no click   Chest Wall:    No abnormalities observed   Abdomen:     Normal bowel sounds, no masses, no organomegaly, soft        non-tender, non-distended, no guarding, no rebound                tenderness, reducible umbilical incisional hernia without evidence of obstruction or gangrene   Rectal:        Extremities:   Moves all extremities well, no edema, no cyanosis, no             redness   Pulses:   Pulses palpable and equal bilaterally   Skin:   No bleeding, bruising or rash   Lymph nodes:   No palpable adenopathy   Neurologic:   A/o x 4 with no deficits       Results Review:   {Results Review:26105::\"I reviewed the patient's new clinical results.\"    LABS/IMAGING:  Results for orders placed or performed in visit on 05/06/22   Hepatitis Panel, Acute    Specimen: Blood   Result Value Ref Range    Hepatitis B Surface Ag Non-Reactive Non-Reactive    Hep A IgM Non-Reactive Non-Reactive    Hep B C IgM Non-Reactive Non-Reactive    Hepatitis C Ab " Reactive (A) Non-Reactive   Ethanol    Specimen: Blood   Result Value Ref Range    Ethanol <10 0 - 10 mg/dL    Ethanol % <0.010 %   CBC Auto Differential    Specimen: Blood   Result Value Ref Range    WBC 7.48 3.40 - 10.80 10*3/mm3    RBC 5.38 4.14 - 5.80 10*6/mm3    Hemoglobin 16.5 13.0 - 17.7 g/dL    Hematocrit 47.6 37.5 - 51.0 %    MCV 88.5 79.0 - 97.0 fL    MCH 30.7 26.6 - 33.0 pg    MCHC 34.7 31.5 - 35.7 g/dL    RDW 12.1 (L) 12.3 - 15.4 %    RDW-SD 38.9 37.0 - 54.0 fl    MPV 10.7 6.0 - 12.0 fL    Platelets 273 140 - 450 10*3/mm3    Neutrophil % 53.3 42.7 - 76.0 %    Lymphocyte % 34.5 19.6 - 45.3 %    Monocyte % 9.0 5.0 - 12.0 %    Eosinophil % 2.1 0.3 - 6.2 %    Basophil % 0.8 0.0 - 1.5 %    Immature Grans % 0.3 0.0 - 0.5 %    Neutrophils, Absolute 3.99 1.70 - 7.00 10*3/mm3    Lymphocytes, Absolute 2.58 0.70 - 3.10 10*3/mm3    Monocytes, Absolute 0.67 0.10 - 0.90 10*3/mm3    Eosinophils, Absolute 0.16 0.00 - 0.40 10*3/mm3    Basophils, Absolute 0.06 0.00 - 0.20 10*3/mm3    Immature Grans, Absolute 0.02 0.00 - 0.05 10*3/mm3    nRBC 0.0 0.0 - 0.2 /100 WBC   Comprehensive Metabolic Panel    Specimen: Blood   Result Value Ref Range    Glucose 83 65 - 99 mg/dL    BUN 14 6 - 20 mg/dL    Creatinine 1.09 0.76 - 1.27 mg/dL    Sodium 137 136 - 145 mmol/L    Potassium 4.0 3.5 - 5.2 mmol/L    Chloride 99 98 - 107 mmol/L    CO2 23.8 22.0 - 29.0 mmol/L    Calcium 10.1 8.6 - 10.5 mg/dL    Total Protein 8.3 6.0 - 8.5 g/dL    Albumin 5.10 3.50 - 5.20 g/dL    ALT (SGPT) 28 1 - 41 U/L    AST (SGOT) 31 1 - 40 U/L    Alkaline Phosphatase 88 39 - 117 U/L    Total Bilirubin 0.3 0.0 - 1.2 mg/dL    Globulin 3.2 gm/dL    A/G Ratio 1.6 g/dL    BUN/Creatinine Ratio 12.8 7.0 - 25.0    Anion Gap 14.2 5.0 - 15.0 mmol/L    eGFR 80.7 >60.0 mL/min/1.73   Drug Screen 10 With / Conf, WB    Specimen: Blood   Result Value Ref Range    Amphetamines, IA ++POSITIVE++ (A) Cutoff:50 ng/mL    Barbiturates, IA Negative Cutoff:0.1 ug/mL    Benzodiazepines,  IA Negative Cutoff:20 ng/mL    COCAINE / METABOLITE, IA Negative Cutoff:25 ng/mL    PHENCYCLIDINE, IA Negative Cutoff:8 ng/mL    THC (marijuana) Mtb Negative Cutoff:5 ng/mL    OPIATES, IA Negative Cutoff:5 ng/mL    OXYCODONES, IA Negative Cutoff:5 ng/mL    METHADONE, IA Negative Cutoff:25 ng/mL    PROPOXYPHENE, IA Negative Cutoff:50 ng/mL   HCV FibroSURE    Specimen: Blood   Result Value Ref Range    Fibrosis Score 0.24 (H) 0.00 - 0.21    Fibrosis Stage F0-F1     Necroinflammat Activity Score 0.12 0.00 - 0.17    Necroinflammat Activity Grade A0-No activity     Alpha 2-Macroglobulins, Qn 238 110 - 276 mg/dL    Haptoglobin 112 29 - 370 mg/dL    Apolipoprotein A-1 135 101 - 178 mg/dL    Total Bilirubin 0.2 0.0 - 1.2 mg/dL    GGT 36 0 - 65 IU/L    ALT (SGPT) 27 0 - 55 IU/L    HCV Qual Interp Comment     Fibrosis Scoring: Comment     Necroinflamm Activity Scoring: Comment     Limitations: Comment     Comment Comment    HCV RNA Qn (Graph) Rfx NS3 / 4A    Specimen: Blood   Result Value Ref Range    Hepatitis C Quantitation HCV Not Detected IU/mL    HCV log10      HCV Test Information Comment     HCV GenoSure(R) NS3/4A     Hepatitis B DNA, Quantitative, PCR    Specimen: Blood   Result Value Ref Range    HBV IU/mL HBV DNA not detected IU/mL    log10 HBV IU/mL      Test Information Comment    HCV Genotyping (PCR) with 1A Subtype Reflex Drug Resist    Specimen: Blood   Result Value Ref Range    Hepatitis C Genotype Comment     Please note Comment    Hepatitis B Core Antibody, Total    Specimen: Blood   Result Value Ref Range    Hep B Core Total Ab Negative Negative   Protime-INR    Specimen: Blood   Result Value Ref Range    Protime 11.8 11.8 - 14.9 Seconds    INR 0.86 0.86 - 1.15   HIV-1 & HIV-2 Antibodies    Specimen: Blood   Result Value Ref Range    HIV-1/ HIV-2 Non-Reactive Non-Reactive   Hepatitis B Surface Antibody    Specimen: Blood   Result Value Ref Range    Hep B S Ab Non-Reactive Non-Reactive   AMPHETAMINES /  MDMA,MS,WB / SP RFX    Specimen: Blood   Result Value Ref Range    Amphetamine Confirmation Positive     Methamphetamine, Urine >1500 ng/mL    Amphetamine Confirmation 52 ng/mL    MDMA Negative ng/mL    MDA Negative ng/mL    MDEA Negative ng/mL   Reflex Test Information    Specimen: Blood   Result Value Ref Range    Reflex Test Information Comment         Result Review :     Assessment & Plan     Initial reducible umbilical incisional hernia without evidence of obstruction or gangrene  Tobacco abuse    I had a discussion with the patient.  I explained to him what a hernia was.  I discussed the warning signs of incarceration and strangulation.   He was instructed to the emergency department for any concern.  I told him he would need to quit smoking prior to elective hernia repair.  He was agreeable.  Smoking cessation counseling performed.  Nicotine patches given.  After he is quit smoking, I recommended robotic possible open umbilical incisional hernia repair with mesh.  I explained the procedure and recovery.  Benefits and alternatives discussed.  Risk procedure including risk of anesthesia, bleeding, infection, mesh infection, conversion open, damage to surrounding structures including bowel, heart attack, stroke, blood clot, pneumonia were discussed.  All questions answered.  He agrees with the plan.  Orders placed.  He was instructed to use chlorhexidine the night before surgery.  Thank you for the consult.    Nick Resendez  reports that he has been smoking cigarettes. He has been smoking an average of 1 pack per day. He has never used smokeless tobacco.. I have educated him on the risk of diseases from using tobacco products such as cancer, COPD and heart disease.     I advised him to quit and he is willing to quit. We have discussed the following method/s for tobacco cessation:  Counseling Prescription Medicaiton.  Together we have set a quit date for 1 week from today.  He will follow up with me in 1 day  or sooner to check on his progress.    I spent 3  minutes counseling the patient.                This document has been electronically signed by Preet Cruz MD  October 31, 2022 19:32 EDT

## 2022-12-19 ENCOUNTER — TELEPHONE (OUTPATIENT)
Dept: SURGERY | Facility: CLINIC | Age: 57
End: 2022-12-19

## 2022-12-19 NOTE — TELEPHONE ENCOUNTER
Per Dr. Cruz, I attempted to reach patient to give him a heads up that depending on the incoming weather at the end of this week, we may have to reschedule his surgery. Dr. Cruz is coming from [place]. Either our office or the hospital will let him know asap if it is going to be rescheduled. Please let patient know if he returns call.

## 2022-12-27 ENCOUNTER — TELEPHONE (OUTPATIENT)
Dept: SURGERY | Facility: CLINIC | Age: 57
End: 2022-12-27
Payer: MEDICARE

## 2022-12-27 NOTE — TELEPHONE ENCOUNTER
Caller: LAWRENCE LAMAR    Relationship to patient: SELF     Best call back number: [phone]    Patient is needing: PT CALLED TO RESCHEDULE SCHEDULE HE DID NOT HAVE ON 12/23/22. PLEASE CALL BACK TO GET HIM SCHEDULED.

## 2022-12-27 NOTE — TELEPHONE ENCOUNTER
Spoke with patient. Will call back to reschedule his surgery. Patient is going to need a new case request for surgery per scheduling at the hospital. Will send message to Dr. Cruz for new case request orders.

## 2022-12-28 ENCOUNTER — PREP FOR SURGERY (OUTPATIENT)
Dept: OTHER | Facility: HOSPITAL | Age: 57
End: 2022-12-28

## 2022-12-28 DIAGNOSIS — K43.2 INCISIONAL HERNIA, WITHOUT OBSTRUCTION OR GANGRENE: Primary | ICD-10-CM

## 2022-12-28 RX ORDER — SODIUM CHLORIDE, SODIUM LACTATE, POTASSIUM CHLORIDE, CALCIUM CHLORIDE 600; 310; 30; 20 MG/100ML; MG/100ML; MG/100ML; MG/100ML
50 INJECTION, SOLUTION INTRAVENOUS CONTINUOUS
Status: CANCELLED | OUTPATIENT
Start: 2022-12-28

## 2022-12-28 RX ORDER — CEFAZOLIN SODIUM 2 G/100ML
2 INJECTION, SOLUTION INTRAVENOUS ONCE
Status: CANCELLED | OUTPATIENT
Start: 2022-12-28 | End: 2022-12-28

## 2023-01-05 NOTE — TELEPHONE ENCOUNTER
Pt is out of town with a sick relative. He cannot have surgery on Monday 01/09. He will call us back next week to try to get back on the schedule.

## 2023-02-10 ENCOUNTER — ANESTHESIA (OUTPATIENT)
Dept: PERIOP | Facility: HOSPITAL | Age: 58
End: 2023-02-10
Payer: MEDICARE

## 2023-02-10 ENCOUNTER — ANESTHESIA EVENT (OUTPATIENT)
Dept: PERIOP | Facility: HOSPITAL | Age: 58
End: 2023-02-10
Payer: MEDICARE

## 2023-02-10 ENCOUNTER — HOSPITAL ENCOUNTER (OUTPATIENT)
Facility: HOSPITAL | Age: 58
Setting detail: HOSPITAL OUTPATIENT SURGERY
Discharge: HOME | End: 2023-02-10
Attending: SURGERY | Admitting: SURGERY
Payer: MEDICARE

## 2023-02-10 VITALS
BODY MASS INDEX: 29.51 KG/M2 | HEART RATE: 84 BPM | RESPIRATION RATE: 16 BRPM | TEMPERATURE: 96.7 F | HEIGHT: 71 IN | DIASTOLIC BLOOD PRESSURE: 82 MMHG | OXYGEN SATURATION: 94 % | WEIGHT: 210.76 LBS | SYSTOLIC BLOOD PRESSURE: 112 MMHG

## 2023-02-10 DIAGNOSIS — K43.2 INCISIONAL HERNIA, WITHOUT OBSTRUCTION OR GANGRENE: ICD-10-CM

## 2023-02-10 PROCEDURE — 0 HYDROMORPHONE 1 MG/ML SOLUTION: Performed by: NURSE ANESTHETIST, CERTIFIED REGISTERED

## 2023-02-10 PROCEDURE — 25010000002 CEFAZOLIN IN DEXTROSE 2-4 GM/100ML-% SOLUTION: Performed by: SURGERY

## 2023-02-10 PROCEDURE — 49591 RPR AA HRN 1ST < 3 CM RDC: CPT

## 2023-02-10 PROCEDURE — 25010000002 FENTANYL CITRATE (PF) 50 MCG/ML SOLUTION: Performed by: NURSE ANESTHETIST, CERTIFIED REGISTERED

## 2023-02-10 PROCEDURE — C1781 MESH (IMPLANTABLE): HCPCS | Performed by: SURGERY

## 2023-02-10 PROCEDURE — 0 MEPERIDINE PER 100 MG: Performed by: NURSE ANESTHETIST, CERTIFIED REGISTERED

## 2023-02-10 PROCEDURE — 25010000002 DEXAMETHASONE SODIUM PHOSPHATE 100 MG/10ML SOLUTION: Performed by: SURGERY

## 2023-02-10 PROCEDURE — 25010000002 KETOROLAC TROMETHAMINE PER 15 MG: Performed by: NURSE ANESTHETIST, CERTIFIED REGISTERED

## 2023-02-10 PROCEDURE — 25010000002 HYDROMORPHONE 1 MG/ML SOLUTION: Performed by: NURSE ANESTHETIST, CERTIFIED REGISTERED

## 2023-02-10 PROCEDURE — 25010000002 DEXAMETHASONE PER 1 MG: Performed by: NURSE ANESTHETIST, CERTIFIED REGISTERED

## 2023-02-10 PROCEDURE — 25010000002 MIDAZOLAM PER 1 MG: Performed by: ANESTHESIOLOGY

## 2023-02-10 PROCEDURE — 25010000002 BUPRENORPHINE PER 0.1 MG: Performed by: SURGERY

## 2023-02-10 PROCEDURE — 25010000002 PROPOFOL 10 MG/ML EMULSION: Performed by: NURSE ANESTHETIST, CERTIFIED REGISTERED

## 2023-02-10 PROCEDURE — 49591 RPR AA HRN 1ST < 3 CM RDC: CPT | Performed by: SURGERY

## 2023-02-10 PROCEDURE — 25010000002 ONDANSETRON PER 1 MG: Performed by: NURSE ANESTHETIST, CERTIFIED REGISTERED

## 2023-02-10 DEVICE — ABSORBABLE WOUND CLOSURE DEVICE
Type: IMPLANTABLE DEVICE | Site: ABDOMEN | Status: FUNCTIONAL
Brand: SYNETURE

## 2023-02-10 DEVICE — ABSORBABLE WOUND CLOSURE DEVICE
Type: IMPLANTABLE DEVICE | Site: ABDOMEN | Status: FUNCTIONAL
Brand: V-LOC 90

## 2023-02-10 DEVICE — VENTRALIGHT ST MESH
Type: IMPLANTABLE DEVICE | Site: ABDOMEN | Status: FUNCTIONAL
Brand: VENTRALIGHT ST

## 2023-02-10 RX ORDER — ONDANSETRON 2 MG/ML
INJECTION INTRAMUSCULAR; INTRAVENOUS AS NEEDED
Status: DISCONTINUED | OUTPATIENT
Start: 2023-02-10 | End: 2023-02-10 | Stop reason: SURG

## 2023-02-10 RX ORDER — MIDAZOLAM HYDROCHLORIDE 1 MG/ML
2 INJECTION INTRAMUSCULAR; INTRAVENOUS ONCE
Status: COMPLETED | OUTPATIENT
Start: 2023-02-10 | End: 2023-02-10

## 2023-02-10 RX ORDER — MEPERIDINE HYDROCHLORIDE 25 MG/ML
12.5 INJECTION INTRAMUSCULAR; INTRAVENOUS; SUBCUTANEOUS
Status: DISCONTINUED | OUTPATIENT
Start: 2023-02-10 | End: 2023-02-10 | Stop reason: HOSPADM

## 2023-02-10 RX ORDER — PROPOFOL 10 MG/ML
VIAL (ML) INTRAVENOUS AS NEEDED
Status: DISCONTINUED | OUTPATIENT
Start: 2023-02-10 | End: 2023-02-10 | Stop reason: SURG

## 2023-02-10 RX ORDER — CEFAZOLIN SODIUM 2 G/100ML
2 INJECTION, SOLUTION INTRAVENOUS ONCE
Status: COMPLETED | OUTPATIENT
Start: 2023-02-10 | End: 2023-02-10

## 2023-02-10 RX ORDER — POLYETHYLENE GLYCOL 3350 17 G/17G
17 POWDER, FOR SOLUTION ORAL DAILY
Qty: 5 PACKET | Refills: 0 | Status: SHIPPED | OUTPATIENT
Start: 2023-02-10

## 2023-02-10 RX ORDER — MAGNESIUM HYDROXIDE 1200 MG/15ML
LIQUID ORAL AS NEEDED
Status: DISCONTINUED | OUTPATIENT
Start: 2023-02-10 | End: 2023-02-10 | Stop reason: HOSPADM

## 2023-02-10 RX ORDER — DEXAMETHASONE SODIUM PHOSPHATE 4 MG/ML
INJECTION, SOLUTION INTRA-ARTICULAR; INTRALESIONAL; INTRAMUSCULAR; INTRAVENOUS; SOFT TISSUE AS NEEDED
Status: DISCONTINUED | OUTPATIENT
Start: 2023-02-10 | End: 2023-02-10 | Stop reason: SURG

## 2023-02-10 RX ORDER — FENTANYL CITRATE 50 UG/ML
INJECTION, SOLUTION INTRAMUSCULAR; INTRAVENOUS AS NEEDED
Status: DISCONTINUED | OUTPATIENT
Start: 2023-02-10 | End: 2023-02-10 | Stop reason: SURG

## 2023-02-10 RX ORDER — LIDOCAINE HYDROCHLORIDE 20 MG/ML
INJECTION, SOLUTION EPIDURAL; INFILTRATION; INTRACAUDAL; PERINEURAL AS NEEDED
Status: DISCONTINUED | OUTPATIENT
Start: 2023-02-10 | End: 2023-02-10 | Stop reason: SURG

## 2023-02-10 RX ORDER — ONDANSETRON 2 MG/ML
4 INJECTION INTRAMUSCULAR; INTRAVENOUS ONCE AS NEEDED
Status: DISCONTINUED | OUTPATIENT
Start: 2023-02-10 | End: 2023-02-10 | Stop reason: HOSPADM

## 2023-02-10 RX ORDER — NICOTINE 21 MG/24HR
1 PATCH, TRANSDERMAL 24 HOURS TRANSDERMAL EVERY 24 HOURS
COMMUNITY

## 2023-02-10 RX ORDER — HYDROCODONE BITARTRATE AND ACETAMINOPHEN 5; 325 MG/1; MG/1
1 TABLET ORAL EVERY 6 HOURS PRN
Qty: 8 TABLET | Refills: 0 | Status: SHIPPED | OUTPATIENT
Start: 2023-02-10 | End: 2023-02-20

## 2023-02-10 RX ORDER — SODIUM CHLORIDE, SODIUM LACTATE, POTASSIUM CHLORIDE, CALCIUM CHLORIDE 600; 310; 30; 20 MG/100ML; MG/100ML; MG/100ML; MG/100ML
9 INJECTION, SOLUTION INTRAVENOUS CONTINUOUS PRN
Status: DISCONTINUED | OUTPATIENT
Start: 2023-02-10 | End: 2023-02-10 | Stop reason: HOSPADM

## 2023-02-10 RX ORDER — ACETAMINOPHEN 500 MG
1000 TABLET ORAL ONCE
Status: COMPLETED | OUTPATIENT
Start: 2023-02-10 | End: 2023-02-10

## 2023-02-10 RX ORDER — OXYCODONE HYDROCHLORIDE 5 MG/1
5 TABLET ORAL
Status: COMPLETED | OUTPATIENT
Start: 2023-02-10 | End: 2023-02-10

## 2023-02-10 RX ORDER — ROCURONIUM BROMIDE 10 MG/ML
INJECTION, SOLUTION INTRAVENOUS AS NEEDED
Status: DISCONTINUED | OUTPATIENT
Start: 2023-02-10 | End: 2023-02-10 | Stop reason: SURG

## 2023-02-10 RX ORDER — PROMETHAZINE HYDROCHLORIDE 25 MG/1
25 SUPPOSITORY RECTAL ONCE AS NEEDED
Status: DISCONTINUED | OUTPATIENT
Start: 2023-02-10 | End: 2023-02-10 | Stop reason: HOSPADM

## 2023-02-10 RX ORDER — KETOROLAC TROMETHAMINE 30 MG/ML
INJECTION, SOLUTION INTRAMUSCULAR; INTRAVENOUS AS NEEDED
Status: DISCONTINUED | OUTPATIENT
Start: 2023-02-10 | End: 2023-02-10 | Stop reason: SURG

## 2023-02-10 RX ORDER — PROMETHAZINE HYDROCHLORIDE 12.5 MG/1
25 TABLET ORAL ONCE AS NEEDED
Status: DISCONTINUED | OUTPATIENT
Start: 2023-02-10 | End: 2023-02-10 | Stop reason: HOSPADM

## 2023-02-10 RX ORDER — SODIUM CHLORIDE, SODIUM LACTATE, POTASSIUM CHLORIDE, CALCIUM CHLORIDE 600; 310; 30; 20 MG/100ML; MG/100ML; MG/100ML; MG/100ML
50 INJECTION, SOLUTION INTRAVENOUS CONTINUOUS
Status: DISCONTINUED | OUTPATIENT
Start: 2023-02-10 | End: 2023-02-10 | Stop reason: HOSPADM

## 2023-02-10 RX ADMIN — ACETAMINOPHEN 1000 MG: 500 TABLET ORAL at 09:43

## 2023-02-10 RX ADMIN — PROPOFOL 200 MG: 10 INJECTION, EMULSION INTRAVENOUS at 09:50

## 2023-02-10 RX ADMIN — ROCURONIUM BROMIDE 50 MG: 10 INJECTION, SOLUTION INTRAVENOUS at 09:50

## 2023-02-10 RX ADMIN — FENTANYL CITRATE 50 MCG: 50 INJECTION, SOLUTION INTRAMUSCULAR; INTRAVENOUS at 09:45

## 2023-02-10 RX ADMIN — MEPERIDINE HYDROCHLORIDE 12.5 MG: 25 INJECTION, SOLUTION INTRAMUSCULAR; INTRAVENOUS; SUBCUTANEOUS at 11:31

## 2023-02-10 RX ADMIN — OXYCODONE 5 MG: 5 TABLET ORAL at 12:28

## 2023-02-10 RX ADMIN — OXYCODONE 5 MG: 5 TABLET ORAL at 12:11

## 2023-02-10 RX ADMIN — SODIUM CHLORIDE, POTASSIUM CHLORIDE, SODIUM LACTATE AND CALCIUM CHLORIDE 9 ML/HR: 600; 310; 30; 20 INJECTION, SOLUTION INTRAVENOUS at 09:43

## 2023-02-10 RX ADMIN — SUGAMMADEX 200 MG: 100 INJECTION, SOLUTION INTRAVENOUS at 10:48

## 2023-02-10 RX ADMIN — CEFAZOLIN SODIUM 2 G: 2 INJECTION, SOLUTION INTRAVENOUS at 09:45

## 2023-02-10 RX ADMIN — FENTANYL CITRATE 50 MCG: 50 INJECTION, SOLUTION INTRAMUSCULAR; INTRAVENOUS at 09:50

## 2023-02-10 RX ADMIN — LIDOCAINE HYDROCHLORIDE 80 MG: 20 INJECTION, SOLUTION EPIDURAL; INFILTRATION; INTRACAUDAL; PERINEURAL at 09:50

## 2023-02-10 RX ADMIN — ONDANSETRON 4 MG: 2 INJECTION INTRAMUSCULAR; INTRAVENOUS at 10:43

## 2023-02-10 RX ADMIN — HYDROMORPHONE HYDROCHLORIDE 0.5 MG: 1 INJECTION, SOLUTION INTRAMUSCULAR; INTRAVENOUS; SUBCUTANEOUS at 10:50

## 2023-02-10 RX ADMIN — KETOROLAC TROMETHAMINE 30 MG: 30 INJECTION, SOLUTION INTRAMUSCULAR; INTRAVENOUS at 10:52

## 2023-02-10 RX ADMIN — MIDAZOLAM HYDROCHLORIDE 2 MG: 2 INJECTION, SOLUTION INTRAMUSCULAR; INTRAVENOUS at 09:43

## 2023-02-10 RX ADMIN — HYDROMORPHONE HYDROCHLORIDE 0.5 MG: 1 INJECTION, SOLUTION INTRAMUSCULAR; INTRAVENOUS; SUBCUTANEOUS at 11:00

## 2023-02-10 RX ADMIN — HYDROMORPHONE HYDROCHLORIDE 0.5 MG: 1 INJECTION, SOLUTION INTRAMUSCULAR; INTRAVENOUS; SUBCUTANEOUS at 11:30

## 2023-02-10 RX ADMIN — DEXAMETHASONE SODIUM PHOSPHATE 4 MG: 4 INJECTION, SOLUTION INTRA-ARTICULAR; INTRALESIONAL; INTRAMUSCULAR; INTRAVENOUS; SOFT TISSUE at 09:57

## 2023-02-10 NOTE — DISCHARGE INSTRUCTIONS
DISCHARGE INSTRUCTIONS  HERNIA      For your surgery you had:  General anesthesia (you may have a sore throat for the first 24 hours)  IV sedation.  Local anesthesia  Monitored anesthesia care  You received a medicated patch for nausea prevention today (behind your ear). It is recommended that you remove it 24-48 hours post-operatively. It must be removed within 72 hours.   You received an anesthesia medication today that can cause hormonal forms of birth control to be ineffective. You should use a different form of birth control (to prevent pregnancy) for 7 days.  You may experience dizziness, drowsiness, or light-headedness for several hours following surgery/procedure.  Do not stay alone today or tonight.  Limit your activity for 24 hours.  Resume your diet slowly.  Follow whatever special dietary instructions you may have been given by your doctor.  You should not drive, operate machinery, drink alcohol, or sign legally binding documents for 24 hours or while you are taking pain medication.  Last dose of pain medication was given at:   .  NOTIFY YOUR DOCTOR IF YOU EXPERIENCE ANY OF THE FOLLOWING:  Temperature greater than 101 degrees Fahrenheit  Shaking Chills  Redness or excessive drainage from incision  Nausea, vomiting and/or pain that is not controlled by prescribed medications  Increase in bleeding or bleeding that is excessive  Unable to urinate in 6 hours after surgery  If unable to reach your doctor, please go to the closest Emergency Room [] You may remove dressing:   [] in 24 hours   [] in 48 hours   [] Other:    [] You may shower or bathe:    Apply an ice pack for 24-48 hours.  [] Wear a jockey support or tight fitting briefs to prevent  swelling.  Do not do any heavy lifting, pushing or pulling.  You may walk up and down stairs.  You may ride in a car but do not drive until instructed by your physician.  Avoid constipation.  If unable to urinate in 6 to 8 hours after surgery or urinating frequently  in small amounts, notify your doctor or go to the nearest Emergency Room.  Medications per physician instructions as indicated on Discharge Medication Information Sheet.  You should see   for follow-up care   on  .  Phone number:       SPECIAL INSTRUCTIONS:                 I have read and received the above instructions.     Patient/Responsible Party's Signature Date/Time     RN Signature Date/Time

## 2023-02-10 NOTE — OP NOTE
OP NOTE  VENTRAL / INCISIONAL HERNIA REPAIR LAPAROSCOPIC WITH [employer] ROBOT  Procedure Report    Patient Name:  Nick Resendez  YOB: 1965  [id number]    Date of Surgery:  2/10/2023     Indications: See last clinic note for indications, discussion of risk benefits and alternatives    Pre-op Diagnosis:   Incisional hernia, without obstruction or gangrene [K43.2]       Post-Op Diagnosis Codes:     * Incisional hernia, without obstruction or gangrene [K43.2]    Procedure/CPT® Codes:      Procedure(s): Robotic ventral incisional hernia repair with mesh, fascial defect measured 3 x 3 cm    Staff:  Surgeon(s):  Preet Cruz MD    Assistant: Maria C Araujo CSA    Anesthesia: General, Local    Estimated Blood Loss: minimal    Implants:    Implant Name Type Inv. Item Serial No.  Lot No. LRB No. Used Action   DEV CLS WND VLOC/90 LUCAS ABS 1/2CIR SZ3/0 26MM 23CM [employer]NAEL - [id number] Implant DEV CLS WND VLOC/90 LUCAS ABS 1/2CIR SZ3/0 26MM 23CM NATANAEL  COVIDIEN [id number] N/A 3 Implanted   DEV CLS WND VLOC/PBT LUCAS NONABS 1/2CIR SZ0 37MM 23CM KIRAN - [id number] Implant DEV CLS WND VLOC/PBT LUCAS NONABS 1/2CIR SZ0 37MM 23CM KIRAN  COVID[employer]EN [id number] N/A 1 Implanted   MESH VENTRALIGHT ST CIR 4.5IN - [id number] Implant MESH VENTRALIGHT ST CIR 4.5IN  [employer]  (DIV [employer]) [id number] N/A 1 Implanted       Specimen:          None      Findings: Robotic repair of initial ventral incisional hernia fascial defect 3 x 3 cm.  Hernia repaired with V-Loc suture followed by 11 cm Ventralight ST mesh secured with V-Loc suture in underlay fashion.    Complications: None    Description of Procedure:   After all questions were answered, consent was verified.  He was brought the operating room per stretcher placed in supine position arms out all extremities padded.  Bilateral lower extremity SCDs placed.  Anesthesia induced.  Preoperative IV antibiotics administered.  Bilateral upper extremities padded and tucked.   Patient's abdomen prepped with ChloraPrep.  Waited 3 minutes.  Draped in usual sterile fashion.  Ioban applied.  Critical timeout taken.  Began the procedure with a stab incision at Noel's point.  Placed a Veress needle.  Positive saline drop test.  Obtain pneumoperitoneum with CO2 insufflation.  Rotated the patient to the right.  Made a transverse incision left mid lateral abdomen.  Placed a robotic 8 trocar and Optiview fashion.  No injury to viscera below from entry in the abdomen.  I then placed a robotic 8 trocar in the left upper quadrant under direct vision.  I infiltrated with local anesthesia bilateral upper quadrants in a tap block fashion.  Placed a robotic 12 trocar in the left lower quadrant.  We then docked the robot and placed instruments.  I then cleared the anterior abdominal wall of intra-abdominal fat.  I then reduced a large hernia sac from the ventral incisional hernia.  The fascial defect measured 3 x 3 cm.  I removed Ethibond sutures from previous hernia repair.  I scored the fascial edges of the fascial defect.  I repaired this with V-Loc suture.  I then placed a Ventralight ST mesh 11 cm securing this with excellent overlap in all directions in underlay fashion with V-Loc suture to the anterior abdominal wall.  Antiadhesion barrier facing the viscera.  I then removed needles and obtained a correct count.  We removed a ruler to measure the defect.  We removed instruments and undocked the robot.  I scrubbed back in.  I closed the left lower quadrant 12 trocar fascia with a Андрей Durham device with Vicryl suture.  Surgical first assist closed the skin incisions with Monocryl and skin glue.  At the end of the procedure all counts were correct.  I was present for the procedure.    Assistant: Maria C Araujo CSA was responsible for performing the following activities: Suturing, Closing, Placing Dressing and Passing instruments with the robot and their skilled assistance was necessary for  the success of this case.    Preet Cruz MD     Date: 2/10/2023  Time: 10:50 EST

## 2023-02-10 NOTE — H&P
No changes since last seen    History of present illness:    History of umbilical hernia repair in the past.  1-1/2-year history of a bulge to his umbilicus with increased size and pain worse with exertion.  No alleviating factors.  No incarceration or strangulation.  No imaging.  No blood thinner use.  No chest pain.  Positive tobacco abuse.        History:  Medical History   History reviewed. No pertinent past medical history.        Surgical History         Past Surgical History:   Procedure Laterality Date   • HAND SURGERY       • INGUINAL HERNIA REPAIR       • UMBILICAL HERNIA REPAIR                History reviewed. No pertinent family history.     Social History            Tobacco Use   • Smoking status: Every Day       Packs/day: 1.00       Types: Cigarettes   • Smokeless tobacco: Never   • Tobacco comments:       12 per day   Vaping Use   • Vaping Use: Some days   • Substances: Nicotine   • Devices: Disposable         Review of Systems  All systems were reviewed and negative except for:   Review of Systems   Constitutional: Negative for chills, fever and unexpected weight loss.   HENT: Negative for congestion, nosebleeds and voice change.    Eyes: Negative for blurred vision, double vision and discharge.   Respiratory: Negative for apnea, chest tightness and shortness of breath.    Cardiovascular: Negative for chest pain and leg swelling.   Gastrointestinal:        See HPI   Endocrine: Negative for cold intolerance and heat intolerance.   Genitourinary: Negative for dysuria, hematuria and urgency.   Musculoskeletal: Negative for back pain, joint swelling and neck pain.   Skin: Negative for color change and dry skin.   Neurological: Negative for dizziness and confusion.   Hematological: Negative for adenopathy.   Psychiatric/Behavioral: Negative for agitation and behavioral problems.      MEDS:          Prior to Admission medications    Medication Sig Start Date End Date Taking? Authorizing Provider    ascorbic acid (VITAMIN C) 1000 MG tablet Take 1,000 mg by mouth Daily.     Yes Marjorie Yoo MD   Biotin 1000 MCG tablet Take 1,000 mcg by mouth 3 (Three) Times a Day.     Yes Provider, MD Marjorie   buPROPion SR (WELLBUTRIN SR) 150 MG 12 hr tablet Take 1 tablet by mouth. 10/3/22 2/1/23 Yes Marjorie Yoo MD   cetirizine (zyrTEC) 10 MG tablet Take 10 mg by mouth Daily.     Yes Provider, MD Marjorie   ergocalciferol (ERGOCALCIFEROL) 1.25 MG (28641 UT) capsule Take 50,000 Units by mouth. 3/11/22 3/12/23 Yes ProviderMarjorie MD   furosemide (LASIX) 20 MG tablet   22   Yes Provider, MD Marjorie   ketoconazole (NIZORAL) 2 % cream SMARTSI Application Topical 1 to 2 Times Daily 22   Yes ProviderMarjorie MD   lisinopril (PRINIVIL,ZESTRIL) 40 MG tablet   22   Yes Provider, MD Marjorie   meloxicam (MOBIC) 15 MG tablet   22   Yes Provider, MD Marjorie   minocycline (MINOCIN,DYNACIN) 100 MG capsule   10/3/22   Yes Provider, MD Marjorie   nicotine polacrilex (NICORETTE) 2 MG gum nicotine (polacrilex) 2 mg buccal gum chew 1 piece of gum (2 mg) by oral route every 2 hours as needed and as directed for 30 days 2021  Active 21   Yes ProviderMarjorie MD   propranolol LA (INDERAL LA) 60 MG 24 hr capsule Take 1 capsule by mouth Daily. 10/3/22 10/4/23 Yes Provider, MD Marjorie   vitamin B-12 (CYANOCOBALAMIN) 100 MCG tablet Take 100 mcg by mouth Daily.     Yes ProviderMarjorie MD   doxycycline (MONODOX) 50 MG capsule doxycycline monohydrate 50 mg oral capsule take 2 capsules (100 mg) by oral route once daily   Active       Provider, MD Marjorie   FLUoxetine (PROzac) 20 MG capsule   22     Naila, MD Marjorie   nicotine (NICODERM CQ) 21 MG/24HR patch Place 1 patch on the skin as directed by provider Daily. 10/27/22     Preet Cruz MD         Allergies:  Azithromycin and Sulfa antibiotics           Objective         Vital  "Signs      Physical Exam:                General Appearance:    Alert, cooperative, in no acute distress   Head:    Normocephalic, without obvious abnormality, atraumatic   Eyes:          Conjunctivae and sclerae normal, no icterus,      Ears:    Ears appear intact with no abnormalities noted   Throat:   No oral lesions, no thrush, oral mucosa moist   Neck:   No adenopathy, supple, trachea midline, no thyromegaly   Back:     No kyphosis present, no scoliosis present, no skin lesions,      erythema or scars, no tenderness to percussion or                   palpation,   range of motion normal   Lungs:     Clear to auscultation,respirations regular, even and                  unlabored    Heart:    Regular rhythm and normal rate, normal S1 and S2, no            murmur, no gallop, no rub, no click   Chest Wall:    No abnormalities observed   Abdomen:     Normal bowel sounds, no masses, no organomegaly, soft        non-tender, non-distended, no guarding, no rebound                tenderness, reducible umbilical incisional hernia without evidence of obstruction or gangrene   Rectal:        Extremities:   Moves all extremities well, no edema, no cyanosis, no             redness   Pulses:   Pulses palpable and equal bilaterally   Skin:   No bleeding, bruising or rash   Lymph nodes:   No palpable adenopathy   Neurologic:   A/o x 4 with no deficits         Results Review:              {Results Review:54807::\"I reviewed the patient's new clinical results.\"     LABS/IMAGING:        Results for orders placed or performed in visit on 05/06/22   Hepatitis Panel, Acute     Specimen: Blood   Result Value Ref Range     Hepatitis B Surface Ag Non-Reactive Non-Reactive     Hep A IgM Non-Reactive Non-Reactive     Hep B C IgM Non-Reactive Non-Reactive     Hepatitis C Ab Reactive (A) Non-Reactive   Ethanol     Specimen: Blood   Result Value Ref Range     Ethanol <10 0 - 10 mg/dL     Ethanol % <0.010 %   CBC Auto Differential     Specimen: " Blood   Result Value Ref Range     WBC 7.48 3.40 - 10.80 10*3/mm3     RBC 5.38 4.14 - 5.80 10*6/mm3     Hemoglobin 16.5 13.0 - 17.7 g/dL     Hematocrit 47.6 37.5 - 51.0 %     MCV 88.5 79.0 - 97.0 fL     MCH 30.7 26.6 - 33.0 pg     MCHC 34.7 31.5 - 35.7 g/dL     RDW 12.1 (L) 12.3 - 15.4 %     RDW-SD 38.9 37.0 - 54.0 fl     MPV 10.7 6.0 - 12.0 fL     Platelets 273 140 - 450 10*3/mm3     Neutrophil % 53.3 42.7 - 76.0 %     Lymphocyte % 34.5 19.6 - 45.3 %     Monocyte % 9.0 5.0 - 12.0 %     Eosinophil % 2.1 0.3 - 6.2 %     Basophil % 0.8 0.0 - 1.5 %     Immature Grans % 0.3 0.0 - 0.5 %     Neutrophils, Absolute 3.99 1.70 - 7.00 10*3/mm3     Lymphocytes, Absolute 2.58 0.70 - 3.10 10*3/mm3     Monocytes, Absolute 0.67 0.10 - 0.90 10*3/mm3     Eosinophils, Absolute 0.16 0.00 - 0.40 10*3/mm3     Basophils, Absolute 0.06 0.00 - 0.20 10*3/mm3     Immature Grans, Absolute 0.02 0.00 - 0.05 10*3/mm3     nRBC 0.0 0.0 - 0.2 /100 WBC   Comprehensive Metabolic Panel     Specimen: Blood   Result Value Ref Range     Glucose 83 65 - 99 mg/dL     BUN 14 6 - 20 mg/dL     Creatinine 1.09 0.76 - 1.27 mg/dL     Sodium 137 136 - 145 mmol/L     Potassium 4.0 3.5 - 5.2 mmol/L     Chloride 99 98 - 107 mmol/L     CO2 23.8 22.0 - 29.0 mmol/L     Calcium 10.1 8.6 - 10.5 mg/dL     Total Protein 8.3 6.0 - 8.5 g/dL     Albumin 5.10 3.50 - 5.20 g/dL     ALT (SGPT) 28 1 - 41 U/L     AST (SGOT) 31 1 - 40 U/L     Alkaline Phosphatase 88 39 - 117 U/L     Total Bilirubin 0.3 0.0 - 1.2 mg/dL     Globulin 3.2 gm/dL     A/G Ratio 1.6 g/dL     BUN/Creatinine Ratio 12.8 7.0 - 25.0     Anion Gap 14.2 5.0 - 15.0 mmol/L     eGFR 80.7 >60.0 mL/min/1.73   Drug Screen 10 With / Conf, WB     Specimen: Blood   Result Value Ref Range     Amphetamines, IA ++POSITIVE++ (A) Cutoff:50 ng/mL     Barbiturates, IA Negative Cutoff:0.1 ug/mL     Benzodiazepines, IA Negative Cutoff:20 ng/mL     COCAINE / METABOLITE, IA Negative Cutoff:25 ng/mL     PHENCYCLIDINE, IA Negative  Cutoff:8 ng/mL     THC (marijuana) Mtb Negative Cutoff:5 ng/mL     OPIATES, IA Negative Cutoff:5 ng/mL     OXYCODONES, IA Negative Cutoff:5 ng/mL     METHADONE, IA Negative Cutoff:25 ng/mL     PROPOXYPHENE, IA Negative Cutoff:50 ng/mL   HCV FibroSURE     Specimen: Blood   Result Value Ref Range     Fibrosis Score 0.24 (H) 0.00 - 0.21     Fibrosis Stage F0-F1       Necroinflammat Activity Score 0.12 0.00 - 0.17     Necroinflammat Activity Grade A0-No activity       Alpha 2-Macroglobulins, Qn 238 110 - 276 mg/dL     Haptoglobin 112 29 - 370 mg/dL     Apolipoprotein A-1 135 101 - 178 mg/dL     Total Bilirubin 0.2 0.0 - 1.2 mg/dL     GGT 36 0 - 65 IU/L     ALT (SGPT) 27 0 - 55 IU/L     HCV Qual Interp Comment       Fibrosis Scoring: Comment       Necroinflamm Activity Scoring: Comment       Limitations: Comment       Comment Comment     HCV RNA Qn (Graph) Rfx NS3 / 4A     Specimen: Blood   Result Value Ref Range     Hepatitis C Quantitation HCV Not Detected IU/mL     HCV log10         HCV Test Information Comment       HCV GenoSure(R) NS3/4A       Hepatitis B DNA, Quantitative, PCR     Specimen: Blood   Result Value Ref Range     HBV IU/mL HBV DNA not detected IU/mL     log10 HBV IU/mL         Test Information Comment     HCV Genotyping (PCR) with 1A Subtype Reflex Drug Resist     Specimen: Blood   Result Value Ref Range     Hepatitis C Genotype Comment       Please note Comment     Hepatitis B Core Antibody, Total     Specimen: Blood   Result Value Ref Range     Hep B Core Total Ab Negative Negative   Protime-INR     Specimen: Blood   Result Value Ref Range     Protime 11.8 11.8 - 14.9 Seconds     INR 0.86 0.86 - 1.15   HIV-1 & HIV-2 Antibodies     Specimen: Blood   Result Value Ref Range     HIV-1/ HIV-2 Non-Reactive Non-Reactive   Hepatitis B Surface Antibody     Specimen: Blood   Result Value Ref Range     Hep B S Ab Non-Reactive Non-Reactive   AMPHETAMINES / MDMA,MS,WB / SP RFX     Specimen: Blood   Result Value  Ref Range     Amphetamine Confirmation Positive       Methamphetamine, Urine >1500 ng/mL     Amphetamine Confirmation 52 ng/mL     MDMA Negative ng/mL     MDA Negative ng/mL     MDEA Negative ng/mL   Reflex Test Information     Specimen: Blood   Result Value Ref Range     Reflex Test Information Comment                 Result Review    :            Assessment & Plan        Initial reducible umbilical incisional hernia without evidence of obstruction or gangrene        robotic possible open umbilical incisional hernia repair with mesh.  I explained the procedure and recovery.  Benefits and alternatives discussed.  Risk procedure including risk of anesthesia, bleeding, infection, mesh infection, conversion open, damage to surrounding structures including bowel, heart attack, stroke, blood clot, pneumonia were discussed.  All questions answered.  He agrees with the plan

## 2023-02-10 NOTE — ANESTHESIA POSTPROCEDURE EVALUATION
Patient: Nick Resendez    Procedure Summary     Date: 02/10/23 Room / Location: [facility]  /  [facility]    Anesthesia Start: 0945 Anesthesia Stop: 1108    Procedure: VENTRAL / INCISIONAL HERNIA REPAIR LAPAROSCOPIC WITH DAVINCI ROBOT with mesh possible open (Abdomen) Diagnosis:       Incisional hernia, without obstruction or gangrene      (Incisional hernia, without obstruction or gangrene [K43.2])    Surgeons: Preet Cruz MD Provider: Kb Mckinnon MD    Anesthesia Type: general ASA Status: 2          Anesthesia Type: general    Vitals  Vitals Value Taken Time   /80 02/10/23 1221   Temp 35.9 °C (96.7 °F) 02/10/23 1105   Pulse 85 02/10/23 1222   Resp 12 02/10/23 1105   SpO2 94 % 02/10/23 1222   Vitals shown include unvalidated device data.        Post Anesthesia Care and Evaluation    Patient location during evaluation: bedside  Patient participation: complete - patient participated  Level of consciousness: awake  Pain management: adequate    Airway patency: patent  PONV Status: none  Cardiovascular status: acceptable  Respiratory status: acceptable  Hydration status: acceptable    Comments: An Anesthesiologist personally participated in the most demanding procedures (including induction and emergence if applicable) in the anesthesia plan, monitored the course of anesthesia administration at frequent intervals and remained physically present and available for immediate diagnosis and treatment of emergencies.

## 2023-02-10 NOTE — ANESTHESIA PREPROCEDURE EVALUATION
Anesthesia Evaluation     Patient summary reviewed and Nursing notes reviewed   history of anesthetic complications:               Airway   Mallampati: I  TM distance: >3 FB  Neck ROM: full  No difficulty expected  Dental      Pulmonary - negative pulmonary ROS and normal exam    breath sounds clear to auscultation  Cardiovascular - normal exam    Rhythm: regular  Rate: normal    (+) hypertension, hyperlipidemia,       Neuro/Psych  (+) psychiatric history,    GI/Hepatic/Renal/Endo - negative ROS     Musculoskeletal (-) negative ROS    Abdominal    Substance History - negative use     OB/GYN negative ob/gyn ROS         Other                        Anesthesia Plan    ASA 2     general     intravenous induction     Anesthetic plan, risks, benefits, and alternatives have been provided, discussed and informed consent has been obtained with: patient.        CODE STATUS:

## 2023-02-17 DIAGNOSIS — K43.2 INCISIONAL HERNIA, WITHOUT OBSTRUCTION OR GANGRENE: ICD-10-CM

## 2023-02-20 ENCOUNTER — TELEPHONE (OUTPATIENT)
Dept: SURGERY | Facility: CLINIC | Age: 58
End: 2023-02-20
Payer: MEDICARE

## 2023-02-20 RX ORDER — HYDROCODONE BITARTRATE AND ACETAMINOPHEN 5; 325 MG/1; MG/1
1 TABLET ORAL EVERY 6 HOURS PRN
Qty: 8 TABLET | Refills: 0 | Status: SHIPPED | OUTPATIENT
Start: 2023-02-20

## 2023-02-20 NOTE — TELEPHONE ENCOUNTER
Patient returned Donna's message. I told patient that his medication has been sent to the pharmacy per message in chart.

## 2023-03-02 ENCOUNTER — OFFICE VISIT (OUTPATIENT)
Dept: SURGERY | Facility: CLINIC | Age: 58
End: 2023-03-02
Payer: MEDICARE

## 2023-03-02 VITALS — HEIGHT: 71 IN | BODY MASS INDEX: 29.01 KG/M2 | WEIGHT: 207.23 LBS | RESPIRATION RATE: 16 BRPM

## 2023-03-02 DIAGNOSIS — I87.2 VENOUS INSUFFICIENCY: Primary | ICD-10-CM

## 2023-03-02 PROCEDURE — 1160F RVW MEDS BY RX/DR IN RCRD: CPT | Performed by: SURGERY

## 2023-03-02 PROCEDURE — 99212 OFFICE O/P EST SF 10 MIN: CPT | Performed by: SURGERY

## 2023-03-02 PROCEDURE — 1159F MED LIST DOCD IN RCRD: CPT | Performed by: SURGERY

## 2023-03-02 RX ORDER — KETOCONAZOLE 20 MG/ML
SHAMPOO TOPICAL
COMMUNITY
Start: 2023-02-13

## 2023-03-02 RX ORDER — CLINDAMYCIN PHOSPHATE 10 UG/ML
LOTION TOPICAL
COMMUNITY
Start: 2023-02-13

## 2023-03-08 NOTE — PROGRESS NOTES
General Surgery/Colorectal Surgery Note    Patient Name:  Nick Reesndez  YOB: 1965  [id number]    Referring Provider: No ref. provider found      Patient Care Team:  Fatmata Ramirez APRN as PCP - General (Nurse Practitioner)  Preet Cruz MD as Consulting Physician (General Surgery)    Chief complaint follow-up    Subjective .     History of present illness:    Status post robotic ventral incisional hernia repair with mesh 2/10/2023.    He comes in for follow-up.  He is feeling much better.  No fever, erythema, drainage.  He is pleased with the surgery.  He said he has had more swelling in his lower extremities recently.      History:  Past Medical History:   Diagnosis Date   • Anxiety    • Bulging lumbar disc    • Bulging of thoracic intervertebral disc    • Edema of both lower extremities due to peripheral venous insufficiency     +4 edema bilateral legs and feet   • Folliculitis    • Hyperlipidemia    • Hypertension    • PONV (postoperative nausea and vomiting)    • Ventral hernia        Past Surgical History:   Procedure Laterality Date   • FINGER SURGERY     • HAND SURGERY     • INGUINAL HERNIA REPAIR     • NASAL SEPTUM SURGERY     • UMBILICAL HERNIA REPAIR     • VENTRAL HERNIA REPAIR N/A 2/10/2023    Procedure: VENTRAL / INCISIONAL HERNIA REPAIR LAPAROSCOPIC WITH DAVINCI ROBOT with mesh possible open;  Surgeon: Preet Cruz MD;  Location: [facility] [place];  Service: Robotics - DaVinci;  Laterality: N/A;       History reviewed. No pertinent family history.    Social History     Tobacco Use   • Smoking status: Every Day     Packs/day: 1.00     Types: Cigarettes   • Smokeless tobacco: Never   • Tobacco comments:     12 per day   Vaping Use   • Vaping Use: Former   • Substances: Nicotine   • Devices: Disposable   Substance Use Topics   • Alcohol use: Never   • Drug use: Never       Review of Systems  All systems were reviewed and negative except for:   Review of Systems    Constitutional: Negative for chills, fever and unexpected weight loss.   HENT: Negative for congestion, nosebleeds and voice change.    Eyes: Negative for blurred vision, double vision and discharge.   Respiratory: Negative for apnea, chest tightness and shortness of breath.    Cardiovascular: Negative for chest pain and leg swelling.   Gastrointestinal:        See HPI   Endocrine: Negative for cold intolerance and heat intolerance.   Genitourinary: Negative for dysuria, hematuria and urgency.   Musculoskeletal: Negative for back pain, joint swelling and neck pain.   Skin: Negative for color change and dry skin.   Neurological: Negative for dizziness and confusion.   Hematological: Negative for adenopathy.   Psychiatric/Behavioral: Negative for agitation and behavioral problems.     MEDS:  Prior to Admission medications    Medication Sig Start Date End Date Taking? Authorizing Provider   ascorbic acid (VITAMIN C) 1000 MG tablet Take 1 tablet by mouth Daily.   Yes Marjorie Yoo MD   aspirin 81 MG chewable tablet Chew 1 tablet Daily. Ciplex last dose 22   Yes Marjorie Yoo MD   Biotin 1000 MCG tablet Take 1,000 mcg by mouth 3 (Three) Times a Day.   Yes Marjorie Yoo MD   cetirizine (zyrTEC) 10 MG tablet Take 1 tablet by mouth Daily.   Yes Marjorie Yoo MD   clindamycin (CLEOCIN T) 1 % lotion  23  Yes Marjorie Yoo MD   ergocalciferol (ERGOCALCIFEROL) 1.25 MG (33975 UT) capsule Take 1 capsule by mouth. 3/11/22 3/12/23 Yes Marjorie Yoo MD   furosemide (LASIX) 20 MG tablet Take 1 tablet by mouth Daily. 22  Yes Marjorie Yoo MD   HYDROcodone-acetaminophen (NORCO) 5-325 MG per tablet Take 1 tablet by mouth every 6 (six) hours as needed for mild pain. 23  Yes Preet Cruz MD   ketoconazole (NIZORAL) 2 % cream SMARTSI Application Topical 1 to 2 Times Daily 22  Yes Marjorie Yoo MD   ketoconazole (NIZORAL) 2 %  "shampoo  2/13/23  Yes Marjorie Yoo MD   lisinopril (PRINIVIL,ZESTRIL) 40 MG tablet Take 1 tablet by mouth Daily. 4/30/22  Yes Marjorie Yoo MD   meloxicam (MOBIC) 15 MG tablet  4/22/22  Yes Marjorie Yoo MD   minocycline (MINOCIN,DYNACIN) 100 MG capsule  10/3/22  Yes Marjorie Yoo MD   nicotine (NICODERM CQ) 14 MG/24HR patch Place 1 patch on the skin as directed by provider Daily. Pt has patch on right shoulder/arm   Yes Marjorie Yoo MD   polyethylene glycol (MIRALAX) 17 g packet Take 17 g by mouth Daily. 2/10/23  Yes Preet Cruz MD   Potassium 75 MG tablet Take 650 mg by mouth.   Yes Marjorie Yoo MD   vitamin B-12 (CYANOCOBALAMIN) 100 MCG tablet Take 1 tablet by mouth Daily.   Yes Marjorie Yoo MD   buPROPion SR (WELLBUTRIN SR) 150 MG 12 hr tablet Take 1 tablet by mouth. 10/3/22 2/1/23  ProviderMarjorie MD        Allergies:  Azithromycin and Sulfa antibiotics    Objective     Vital Signs        Physical Exam: Incisions without evidence of infection, abdomen soft, appropriately tender, no hernia, evidence of venous insufficiency bilateral lower extremities, 1-2+ pitting edema        Results Review:   {Results Review:02273::\"I reviewed the patient's new clinical results.\"    LABS/IMAGING:  Results for orders placed or performed in visit on 05/06/22   Hepatitis Panel, Acute    Specimen: Blood   Result Value Ref Range    Hepatitis B Surface Ag Non-Reactive Non-Reactive    Hep A IgM Non-Reactive Non-Reactive    Hep B C IgM Non-Reactive Non-Reactive    Hepatitis C Ab Reactive (A) Non-Reactive   Ethanol    Specimen: Blood   Result Value Ref Range    Ethanol <10 0 - 10 mg/dL    Ethanol % <0.010 %   CBC Auto Differential    Specimen: Blood   Result Value Ref Range    WBC 7.48 3.40 - 10.80 10*3/mm3    RBC 5.38 4.14 - 5.80 10*6/mm3    Hemoglobin 16.5 13.0 - 17.7 g/dL    Hematocrit 47.6 37.5 - 51.0 %    MCV 88.5 79.0 - 97.0 fL    MCH 30.7 26.6 - 33.0 pg "    MCHC 34.7 31.5 - 35.7 g/dL    RDW 12.1 (L) 12.3 - 15.4 %    RDW-SD 38.9 37.0 - 54.0 fl    MPV 10.7 6.0 - 12.0 fL    Platelets 273 140 - 450 10*3/mm3    Neutrophil % 53.3 42.7 - 76.0 %    Lymphocyte % 34.5 19.6 - 45.3 %    Monocyte % 9.0 5.0 - 12.0 %    Eosinophil % 2.1 0.3 - 6.2 %    Basophil % 0.8 0.0 - 1.5 %    Immature Grans % 0.3 0.0 - 0.5 %    Neutrophils, Absolute 3.99 1.70 - 7.00 10*3/mm3    Lymphocytes, Absolute 2.58 0.70 - 3.10 10*3/mm3    Monocytes, Absolute 0.67 0.10 - 0.90 10*3/mm3    Eosinophils, Absolute 0.16 0.00 - 0.40 10*3/mm3    Basophils, Absolute 0.06 0.00 - 0.20 10*3/mm3    Immature Grans, Absolute 0.02 0.00 - 0.05 10*3/mm3    nRBC 0.0 0.0 - 0.2 /100 WBC   Comprehensive Metabolic Panel    Specimen: Blood   Result Value Ref Range    Glucose 83 65 - 99 mg/dL    BUN 14 6 - 20 mg/dL    Creatinine 1.09 0.76 - 1.27 mg/dL    Sodium 137 136 - 145 mmol/L    Potassium 4.0 3.5 - 5.2 mmol/L    Chloride 99 98 - 107 mmol/L    CO2 23.8 22.0 - 29.0 mmol/L    Calcium 10.1 8.6 - 10.5 mg/dL    Total Protein 8.3 6.0 - 8.5 g/dL    Albumin 5.10 3.50 - 5.20 g/dL    ALT (SGPT) 28 1 - 41 U/L    AST (SGOT) 31 1 - 40 U/L    Alkaline Phosphatase 88 39 - 117 U/L    Total Bilirubin 0.3 0.0 - 1.2 mg/dL    Globulin 3.2 gm/dL    A/G Ratio 1.6 g/dL    BUN/Creatinine Ratio 12.8 7.0 - 25.0    Anion Gap 14.2 5.0 - 15.0 mmol/L    eGFR 80.7 >60.0 mL/min/1.73   Drug Screen 10 With / Conf, WB    Specimen: Blood   Result Value Ref Range    Amphetamines, IA ++POSITIVE++ (A) Cutoff:50 ng/mL    Barbiturates, IA Negative Cutoff:0.1 ug/mL    Benzodiazepines, IA Negative Cutoff:20 ng/mL    COCAINE / METABOLITE, IA Negative Cutoff:25 ng/mL    PHENCYCLIDINE, IA Negative Cutoff:8 ng/mL    THC (marijuana) Mtb Negative Cutoff:5 ng/mL    OPIATES, IA Negative Cutoff:5 ng/mL    OXYCODONES, IA Negative Cutoff:5 ng/mL    METHADONE, IA Negative Cutoff:25 ng/mL    PROPOXYPHENE, IA Negative Cutoff:50 ng/mL   HCV FibroSURE    Specimen: Blood   Result  Value Ref Range    Fibrosis Score 0.24 (H) 0.00 - 0.21    Fibrosis Stage F0-F1     Necroinflammat Activity Score 0.12 0.00 - 0.17    Necroinflammat Activity Grade A0-No activity     Alpha 2-Macroglobulins, Qn 238 110 - 276 mg/dL    Haptoglobin 112 29 - 370 mg/dL    Apolipoprotein A-1 135 101 - 178 mg/dL    Total Bilirubin 0.2 0.0 - 1.2 mg/dL    GGT 36 0 - 65 IU/L    ALT (SGPT) 27 0 - 55 IU/L    HCV Qual Interp Comment     Fibrosis Scoring: Comment     Necroinflamm Activity Scoring: Comment     Limitations: Comment     Comment Comment    HCV RNA Qn (Graph) Rfx NS3 / 4A    Specimen: Blood   Result Value Ref Range    Hepatitis C Quantitation HCV Not Detected IU/mL    HCV log10      HCV Test Information Comment     HCV GenoSure(R) NS3/4A     Hepatitis B DNA, Quantitative, PCR    Specimen: Blood   Result Value Ref Range    HBV IU/mL HBV DNA not detected IU/mL    log10 HBV IU/mL      Test Information Comment    HCV Genotyping (PCR) with 1A Subtype Reflex Drug Resist    Specimen: Blood   Result Value Ref Range    Hepatitis C Genotype Comment     Please note Comment    Hepatitis B Core Antibody, Total    Specimen: Blood   Result Value Ref Range    Hep B Core Total Ab Negative Negative   Protime-INR    Specimen: Blood   Result Value Ref Range    Protime 11.8 11.8 - 14.9 Seconds    INR 0.86 0.86 - 1.15   HIV-1 & HIV-2 Antibodies    Specimen: Blood   Result Value Ref Range    HIV-1/ HIV-2 Non-Reactive Non-Reactive   Hepatitis B Surface Antibody    Specimen: Blood   Result Value Ref Range    Hep B S Ab Non-Reactive Non-Reactive   AMPHETAMINES / MDMA,MS,WB / SP RFX    Specimen: Blood   Result Value Ref Range    Amphetamine Confirmation Positive     Methamphetamine, Urine >1500 ng/mL    Amphetamine Confirmation 52 ng/mL    MDMA Negative ng/mL    MDA Negative ng/mL    MDEA Negative ng/mL   Reflex Test Information    Specimen: Blood   Result Value Ref Range    Reflex Test Information Comment         Result Review :     Assessment &  Plan      Status post robotic ventral incisional hernia repair with mesh 2/10/2023.  Venous insufficiency  Lower extremity swelling    Discussion with patient.  I reviewed the findings at the time of surgery.  No lifting greater than 10 pounds for 6 weeks.  For his venous insufficiency I will refer him to vascular surgery.  Consult placed.  I recommend that he discuss his edema with his primary care physician as that medications may need to be adjusted.  Follow-up me as needed.  All questions answered.  He agrees with the plan.  Thank for the consult.           This document has been electronically signed by Preet Cruz MD  March 8, 2023 11:51 EST

## 2023-03-28 ENCOUNTER — OFFICE VISIT (OUTPATIENT)
Dept: VASCULAR SURGERY | Facility: HOSPITAL | Age: 58
End: 2023-03-28
Payer: MEDICARE

## 2023-03-28 VITALS
RESPIRATION RATE: 16 BRPM | TEMPERATURE: 97.7 F | OXYGEN SATURATION: 99 % | HEART RATE: 98 BPM | DIASTOLIC BLOOD PRESSURE: 90 MMHG | SYSTOLIC BLOOD PRESSURE: 140 MMHG

## 2023-03-28 DIAGNOSIS — I86.8 VARICOSE VEINS OF OTHER SPECIFIED SITES: ICD-10-CM

## 2023-03-28 DIAGNOSIS — I87.323 CHRONIC VENOUS HYPERTENSION (IDIOPATHIC) WITH INFLAMMATION OF BILATERAL LOWER EXTREMITY: ICD-10-CM

## 2023-03-28 DIAGNOSIS — I87.2 CHRONIC VENOUS INSUFFICIENCY: Primary | ICD-10-CM

## 2023-03-28 PROCEDURE — G0463 HOSPITAL OUTPT CLINIC VISIT: HCPCS | Performed by: SURGERY

## 2023-03-28 PROCEDURE — 99203 OFFICE O/P NEW LOW 30 MIN: CPT | Performed by: SURGERY

## 2023-03-28 NOTE — PROGRESS NOTES
[facility]   HISTORY AND PHYSICAL    Patient Name: Nick Resendez  : 1965  MRN: [id number]  Primary Care Physician:  Fatmata Ramirez APRN      Subjective   Subjective     Chief Complaint: Lower extremity edema    HPI:    Nick Resendez is a 57 y.o. male who has severe lower extremity edema starting about a year ago.  Left leg is worse than right.  This is progressed rapidly and over the last 6 months he has noticed significant hyperpigmentation on both his legs.  He does not remember having any DVTs.  He has no family history of DVTs.  He has been in compression stockings and that helps a little but he still has significant edema especially at the end of the day.  When the edema gets significantly worse he is unable to walk properly.  He recently underwent abdominal hernia repair and his surgeon was concerned and referred him to us.    The patient does not have any prior cardiopulmonary problems.  No surgical lower extremity treatment.    Review of Systems  No chest pain or shortness of breath.  Patient is a current smoker and has tried to cut down.    Personal History     Past Medical History:   Diagnosis Date   • Anxiety    • Bulging lumbar disc    • Bulging of thoracic intervertebral disc    • Edema of both lower extremities due to peripheral venous insufficiency     +4 edema bilateral legs and feet   • Folliculitis    • Hyperlipidemia    • Hypertension    • PONV (postoperative nausea and vomiting)    • Ventral hernia        Past Surgical History:   Procedure Laterality Date   • FINGER SURGERY     • HAND SURGERY     • INGUINAL HERNIA REPAIR     • NASAL SEPTUM SURGERY     • UMBILICAL HERNIA REPAIR     • VENTRAL HERNIA REPAIR N/A 2/10/2023    Procedure: VENTRAL / INCISIONAL HERNIA REPAIR LAPAROSCOPIC WITH ViroolINCI ROBOT with mesh possible open;  Surgeon: Preet Cruz MD;  Location: [facility] OR [facility] [place];  Service: Robotics - DaVinci;  Laterality: N/A;       Family History: family history  is not on file. Otherwise pertinent FHx was reviewed and not pertinent to current issue.    Social History:  reports that he has been smoking cigarettes. He has been smoking an average of 1 pack per day. He has never used smokeless tobacco. He reports that he does not drink alcohol and does not use drugs.    Home Medications:  Current Outpatient Medications on File Prior to Visit   Medication Sig   • ascorbic acid (VITAMIN C) 1000 MG tablet Take 1 tablet by mouth Daily.   • aspirin 81 MG chewable tablet Chew 1 tablet Daily. [facility] last dose 22   • Biotin 1000 MCG tablet Take 1,000 mcg by mouth 3 (Three) Times a Day.   • buPROPion SR (WELLBUTRIN SR) 150 MG 12 hr tablet Take 1 tablet by mouth.   • cetirizine (zyrTEC) 10 MG tablet Take 1 tablet by mouth Daily.   • clindamycin (CLEOCIN T) 1 % lotion    • furosemide (LASIX) 20 MG tablet Take 1 tablet by mouth Daily.   • HYDROcodone-acetaminophen (NORCO) 5-325 MG per tablet Take 1 tablet by mouth every 6 (six) hours as needed for mild pain.   • ketoconazole (NIZORAL) 2 % cream SMARTSI Application Topical 1 to 2 Times Daily   • ketoconazole (NIZORAL) 2 % shampoo    • lisinopril (PRINIVIL,ZESTRIL) 40 MG tablet Take 1 tablet by mouth Daily.   • meloxicam (MOBIC) 15 MG tablet    • minocycline (MINOCIN,DYNACIN) 100 MG capsule    • nicotine (NICODERM CQ) 14 MG/24HR patch Place 1 patch on the skin as directed by provider Daily. Pt has patch on right shoulder/arm   • polyethylene glycol (MIRALAX) 17 g packet Take 17 g by mouth Daily.   • Potassium 75 MG tablet Take 650 mg by mouth.   • vitamin B-12 (CYANOCOBALAMIN) 100 MCG tablet Take 1 tablet by mouth Daily.     No current facility-administered medications on file prior to visit.          Allergies:  Allergies   Allergen Reactions   • Azithromycin Nausea Only   • Sulfa Antibiotics Nausea Only       Objective   Objective     Vitals:   Temp:  [97.7 °F (36.5 °C)] 97.7 °F (36.5 °C)  Heart Rate:  [98] 98  Resp:  [16]  16  BP: (140)/(90) 140/90    Physical Exam   Patient is awake and alert.  Appears stated age.  No acute distress.  Appears uncomfortable with his edema.  Compression stockings were in place.  He does have significant edema once compression stockings are removed.  Severe lipodermatosclerosis on both legs right foot being more extensive than the left foot but overall the edema is worse on the left leg.  Varicosities noted in the left lower leg.  No proximal varicosities noted.  No supra varicosities noted.  Abdomen is firm from recent hernia repair.  Incisions are healing well.  Rhythm is regular.  Nonlabored breathing.     Result Review    Most notable findings include: Pending    Assessment & Plan   Assessment / Plan     Brief Patient Summary:  Nick Resendez is a 57 y.o. male who has severe lower extremity edema and rapidly progressing hyperpigmentation changes.  I discussed the need for leg elevation and continue compression stockings.  We also discussed the need for smoking cessation to avoid any further thrombotic episodes.  I am concerned that he may have had an underlying undiagnosed thrombotic episode with the rapid progression.  He may have just primary venous reflux.    Diagnoses and all orders for this visit:    1. Chronic venous insufficiency (Primary)  -     Duplex Venous Lower Extremity - Bilateral CAR; Future  -     Duplex Aorta IVC Iliac Graft Complete CAR; Future    2. Chronic venous hypertension (idiopathic) with inflammation of bilateral lower extremity  -     Duplex Venous Lower Extremity - Bilateral CAR; Future    3. Varicose veins of other specified sites  -     Duplex Aorta IVC Iliac Graft Complete CAR; Future         Plan:   I will get venous reflux study which will also include a DVT study but also do IVC iliac vein evaluation to make sure he does not have any obstruction.  He should continue compression therapy.  Discussed leg elevation when he is not walking.  Discussed need for  regimented exercise.  Also discussed smoking cessation.  He is motivated.  We will see him back after his study is complete.  All questions answered.        Electronically signed by Lucio Johnston MD, 03/28/23, 3:00 PM EDT.

## 2023-05-04 ENCOUNTER — OFFICE VISIT (OUTPATIENT)
Dept: VASCULAR SURGERY | Facility: HOSPITAL | Age: 58
End: 2023-05-04
Payer: MEDICARE

## 2023-05-04 ENCOUNTER — HOSPITAL ENCOUNTER (OUTPATIENT)
Dept: CARDIOLOGY | Facility: HOSPITAL | Age: 58
Discharge: HOME | End: 2023-05-04
Payer: MEDICARE

## 2023-05-04 VITALS
TEMPERATURE: 97.5 F | DIASTOLIC BLOOD PRESSURE: 86 MMHG | SYSTOLIC BLOOD PRESSURE: 120 MMHG | OXYGEN SATURATION: 95 % | HEART RATE: 75 BPM | RESPIRATION RATE: 16 BRPM

## 2023-05-04 DIAGNOSIS — I87.2 CHRONIC VENOUS INSUFFICIENCY: ICD-10-CM

## 2023-05-04 DIAGNOSIS — I86.8 VARICOSE VEINS OF OTHER SPECIFIED SITES: ICD-10-CM

## 2023-05-04 DIAGNOSIS — I87.323 CHRONIC VENOUS HYPERTENSION (IDIOPATHIC) WITH INFLAMMATION OF BILATERAL LOWER EXTREMITY: ICD-10-CM

## 2023-05-04 DIAGNOSIS — I87.323 CHRONIC VENOUS HYPERTENSION (IDIOPATHIC) WITH INFLAMMATION OF BILATERAL LOWER EXTREMITY: Primary | ICD-10-CM

## 2023-05-04 LAB
BH CV LOWER VASCULAR LEFT COMMON FEMORAL AUGMENT: NORMAL
BH CV LOWER VASCULAR LEFT COMMON FEMORAL COMPETENT: NORMAL
BH CV LOWER VASCULAR LEFT COMMON FEMORAL COMPRESS: NORMAL
BH CV LOWER VASCULAR LEFT COMMON FEMORAL PHASIC: NORMAL
BH CV LOWER VASCULAR LEFT COMMON FEMORAL SPONT: NORMAL
BH CV LOWER VASCULAR LEFT DISTAL FEMORAL COMPRESS: NORMAL
BH CV LOWER VASCULAR LEFT GASTRONEMIUS COMPRESS: NORMAL
BH CV LOWER VASCULAR LEFT GREATER SAPH AK COMPRESS: NORMAL
BH CV LOWER VASCULAR LEFT GREATER SAPH BK COMPRESS: NORMAL
BH CV LOWER VASCULAR LEFT LESSER SAPH COMPRESS: NORMAL
BH CV LOWER VASCULAR LEFT MID FEMORAL AUGMENT: NORMAL
BH CV LOWER VASCULAR LEFT MID FEMORAL COMPETENT: NORMAL
BH CV LOWER VASCULAR LEFT MID FEMORAL COMPRESS: NORMAL
BH CV LOWER VASCULAR LEFT MID FEMORAL PHASIC: NORMAL
BH CV LOWER VASCULAR LEFT MID FEMORAL SPONT: NORMAL
BH CV LOWER VASCULAR LEFT PERONEAL COMPRESS: NORMAL
BH CV LOWER VASCULAR LEFT POPLITEAL AUGMENT: NORMAL
BH CV LOWER VASCULAR LEFT POPLITEAL COMPETENT: NORMAL
BH CV LOWER VASCULAR LEFT POPLITEAL COMPRESS: NORMAL
BH CV LOWER VASCULAR LEFT POPLITEAL PHASIC: NORMAL
BH CV LOWER VASCULAR LEFT POPLITEAL SPONT: NORMAL
BH CV LOWER VASCULAR LEFT POSTERIOR TIBIAL COMPRESS: NORMAL
BH CV LOWER VASCULAR LEFT PROXIMAL FEMORAL COMPRESS: NORMAL
BH CV LOWER VASCULAR LEFT SAPHENOFEMORAL JUNCTION COMPRESS: NORMAL
BH CV LOWER VASCULAR RIGHT COMMON FEMORAL AUGMENT: NORMAL
BH CV LOWER VASCULAR RIGHT COMMON FEMORAL COMPETENT: NORMAL
BH CV LOWER VASCULAR RIGHT COMMON FEMORAL COMPRESS: NORMAL
BH CV LOWER VASCULAR RIGHT COMMON FEMORAL PHASIC: NORMAL
BH CV LOWER VASCULAR RIGHT COMMON FEMORAL SPONT: NORMAL
BH CV LOWER VASCULAR RIGHT DISTAL FEMORAL COMPRESS: NORMAL
BH CV LOWER VASCULAR RIGHT GASTRONEMIUS COMPRESS: NORMAL
BH CV LOWER VASCULAR RIGHT GREATER SAPH AK COMPRESS: NORMAL
BH CV LOWER VASCULAR RIGHT GREATER SAPH BK COMPRESS: NORMAL
BH CV LOWER VASCULAR RIGHT LESSER SAPH COMPRESS: NORMAL
BH CV LOWER VASCULAR RIGHT MID FEMORAL AUGMENT: NORMAL
BH CV LOWER VASCULAR RIGHT MID FEMORAL COMPETENT: NORMAL
BH CV LOWER VASCULAR RIGHT MID FEMORAL COMPRESS: NORMAL
BH CV LOWER VASCULAR RIGHT MID FEMORAL PHASIC: NORMAL
BH CV LOWER VASCULAR RIGHT MID FEMORAL SPONT: NORMAL
BH CV LOWER VASCULAR RIGHT PERONEAL COMPRESS: NORMAL
BH CV LOWER VASCULAR RIGHT POPLITEAL AUGMENT: NORMAL
BH CV LOWER VASCULAR RIGHT POPLITEAL COMPETENT: NORMAL
BH CV LOWER VASCULAR RIGHT POPLITEAL COMPRESS: NORMAL
BH CV LOWER VASCULAR RIGHT POPLITEAL PHASIC: NORMAL
BH CV LOWER VASCULAR RIGHT POPLITEAL SPONT: NORMAL
BH CV LOWER VASCULAR RIGHT POSTERIOR TIBIAL COMPRESS: NORMAL
BH CV LOWER VASCULAR RIGHT PROXIMAL FEMORAL COMPRESS: NORMAL
BH CV LOWER VASCULAR RIGHT SAPHENOFEMORAL JUNCTION COMPRESS: NORMAL
BH CV VAS ABD AO IVC SPONTANEOUS SCRIPTING: NORMAL
BH CV VAS ABD AO LT COMMON FEM SPONT SCRIPTING: NORMAL
BH CV VAS ABD AO LT COMMON ILIAC SPONTANEOUS SCRIPTING: NORMAL
BH CV VAS ABD AO LT EXTERNAL ILIAC SPONT SCRIPTING: NORMAL
BH CV VAS ABD AO RT COM ILIAC SPONTANEOUS SCRIPTING: NORMAL
BH CV VAS ABD AO RT COMMON FEM SPONTANEOUS SCRIPTING: NORMAL
BH CV VAS ABD AO RT EXT ILIAC SPONTANEOUS SCRIPTING: NORMAL
Lab: NORMAL
Lab: NORMAL
MAXIMAL PREDICTED HEART RATE: 165 BPM
MAXIMAL PREDICTED HEART RATE: 165 BPM
STRESS TARGET HR: 140 BPM
STRESS TARGET HR: 140 BPM

## 2023-05-04 PROCEDURE — 93970 EXTREMITY STUDY: CPT

## 2023-05-04 PROCEDURE — 93978 VASCULAR STUDY: CPT

## 2023-05-04 RX ORDER — VARENICLINE TARTRATE 1 MG/1
1 TABLET, FILM COATED ORAL 2 TIMES DAILY
COMMUNITY

## 2023-05-04 NOTE — PROGRESS NOTES
[facility] Vascular Surgery Office Follow Up Note     Date of Encounter: 05/04/2023     MRN Number: [id number]  Name: Nick Resendez  Phone Number: [phone]     Referred By: Lucio Johnston MD  PCP: Chantal Chawla APRN    Chief Complaint:    Chief Complaint   Patient presents with   • Follow-up     PATIENT IS HERE AS A FOLLOW UP WITH VEIN STUDIES TODAY. PATIENT CONTINUES TO WEAR COMPRESSION STOCKINGS. PATIENT REPORTS STARTING CHANTIX AND HAS DECREASED SMOKING SIGNIFICANTLY.        Subjective      History of Present Illness:    Nick Resendez is a 57 y.o. male presents for scheduled follow-up with a venous study of the lower extremity and abdomen performed today.  He previously seen Dr. Johnston for lower extremity edema with the left leg being worse than the right that has evidently progressed in the past 6 to 7 months.  Denies any history of DVT's.  He has been wearing his compression stockings as recommended, still has discoloration of both lower extremities. He does take a daily aspirin.     Review of Systems:  ROS  Review of Systems   Constitutional: Negative.   HENT: Negative.    Cardiovascular: bilateral leg swelling and discoloration.    Respiratory: Negative.    Skin: Negative.    Musculoskeletal: Negative.    Gastrointestinal: Negative.    Neurological: Negative.    Psychiatric/Behavioral: Negative.      I have reviewed the following portions of the patient's history: allergies, current medications, past family history, past medical history, past social history, past surgical history and problem list and confirm it's accurate.    Allergies:  Allergies   Allergen Reactions   • Azithromycin Nausea Only   • Sulfa Antibiotics Nausea Only       Medications:      Current Outpatient Medications:   •  ascorbic acid (VITAMIN C) 1000 MG tablet, Take 1 tablet by mouth Daily., Disp: , Rfl:   •  aspirin 81 MG chewable tablet, Chew 1 tablet Daily. [facility] last dose 12-20-22, Disp: , Rfl:   •   Biotin 1000 MCG tablet, Take 1,000 mcg by mouth 3 (Three) Times a Day., Disp: , Rfl:   •  cetirizine (zyrTEC) 10 MG tablet, Take 1 tablet by mouth Daily., Disp: , Rfl:   •  clindamycin (CLEOCIN T) 1 % lotion, , Disp: , Rfl:   •  furosemide (LASIX) 20 MG tablet, Take 1 tablet by mouth Daily., Disp: , Rfl:   •  HYDROcodone-acetaminophen (NORCO) 5-325 MG per tablet, Take 1 tablet by mouth every 6 (six) hours as needed for mild pain., Disp: 8 tablet, Rfl: 0  •  ketoconazole (NIZORAL) 2 % cream, SMARTSI Application Topical 1 to 2 Times Daily, Disp: , Rfl:   •  ketoconazole (NIZORAL) 2 % shampoo, , Disp: , Rfl:   •  lisinopril (PRINIVIL,ZESTRIL) 40 MG tablet, Take 1 tablet by mouth Daily., Disp: , Rfl:   •  meloxicam (MOBIC) 15 MG tablet, , Disp: , Rfl:   •  minocycline (MINOCIN,DYNACIN) 100 MG capsule, , Disp: , Rfl:   •  nicotine (NICODERM CQ) 14 MG/24HR patch, Place 1 patch on the skin as directed by provider Daily. Pt has patch on right shoulder/arm, Disp: , Rfl:   •  polyethylene glycol (MIRALAX) 17 g packet, Take 17 g by mouth Daily., Disp: 5 packet, Rfl: 0  •  Potassium 75 MG tablet, Take 650 mg by mouth., Disp: , Rfl:   •  varenicline (CHANTIX) 1 MG tablet, Take 1 tablet by mouth 2 (Two) Times a Day., Disp: , Rfl:   •  vitamin B-12 (CYANOCOBALAMIN) 100 MCG tablet, Take 1 tablet by mouth Daily., Disp: , Rfl:   •  buPROPion SR (WELLBUTRIN SR) 150 MG 12 hr tablet, Take 1 tablet by mouth., Disp: , Rfl:     History:   Past Medical History:   Diagnosis Date   • Anxiety    • Bulging lumbar disc    • Bulging of thoracic intervertebral disc    • Edema of both lower extremities due to peripheral venous insufficiency     +4 edema bilateral legs and feet   • Folliculitis    • Hyperlipidemia    • Hypertension    • PONV (postoperative nausea and vomiting)    • Ventral hernia        Past Surgical History:   Procedure Laterality Date   • FINGER SURGERY     • HAND SURGERY     • INGUINAL HERNIA REPAIR     • NASAL SEPTUM SURGERY      • UMBILICAL HERNIA REPAIR     • VENTRAL HERNIA REPAIR N/A 2/10/2023    Procedure: VENTRAL / INCISIONAL HERNIA REPAIR LAPAROSCOPIC WITH DAVINCI ROBOT with mesh possible open;  Surgeon: Preet Cruz MD;  Location: [facility] [place];  Service: Robotics - DaVinci;  Laterality: N/A;       Social History     Socioeconomic History   • Marital status:    Tobacco Use   • Smoking status: Every Day     Packs/day: 0.50     Types: Cigarettes   • Smokeless tobacco: Never   • Tobacco comments:     12 per day   Vaping Use   • Vaping Use: Former   • Substances: Nicotine   • Devices: Disposable   Substance and Sexual Activity   • Alcohol use: Never   • Drug use: Never   • Sexual activity: Defer        History reviewed. No pertinent family history.    Objective     Physical Exam:  Vitals:    05/04/23 1106   BP: 120/86   BP Location: Right arm   Patient Position: Sitting   Cuff Size: Large Adult   Pulse: 75   Resp: 16   Temp: 97.5 °F (36.4 °C)   TempSrc: Temporal   SpO2: 95%   PainSc:   6   PainLoc: Back      There is no height or weight on file to calculate BMI.    Physical Exam  Physical Exam  Constitutional:       Appearance: Normal appearance.   HENT:      Head: Normocephalic.   Cardiovascular:      Rate and Rhythm: Normal rate.      Pulses: Normal pulses.      Comments:bilateral lower extremities: +2 Palpable pedal pulses  Pulmonary:      Effort: Pulmonary effort is normal.   Musculoskeletal:         General: Normal range of motion.      Cervical back: Normal range of motion.   Skin:     General: Skin is warm and dry.      Capillary Refill: Capillary refill takes less than 2 seconds.      Comments: bilateral lower extremities: mild edema, dark discoloration, no open areas or signs of infection.   Neurological:      General: No focal deficit present.      Mental Status: Alert and oriented to person, place, and time.   Psychiatric:         Mood and Affect: Mood normal.         Behavior: Behavior  normal.  Imaging/Labs:          Assessment / Plan      Assessment / Plan:  Diagnoses and all orders for this visit:    1. Chronic venous hypertension (idiopathic) with inflammation of bilateral lower extremity (Primary)  -     CT Angiogram Abdomen Pelvis; Future    2. Varicose veins of other specified sites  -     CT Angiogram Abdomen Pelvis; Future    3. Chronic venous insufficiency  -     CT Angiogram Abdomen Pelvis; Future       Mr. Resendez had a venous duplex and duplex of the aorta performed today. He did have some incompetence noted int the left saphenofemoral junction, no evidence of DVT or superficial thrombophlebitis. I have reviewed the results with Dr. Johnston and he recommends that we get a CTA Ab/Pel with venous phase and to follow up with his cardiologist to get a workup. He is a patient of Dr. Mcdermott but missed his last appointment. We have reached out to them to get him rescheduled for a follow up appointment. We will follow up in the office once his CTA is complete.     I have answered all his questions and he is in agreement with the plan.      Thank you for allowing me to participate in your patient's care.    Patient Education: continue with knee high compression therapy         Follow Up:   No follow-ups on file.   Or sooner for any further concerns or worsening sign and symptoms. If unable to reach us in the office please dial 911 or go to the nearest emergency department.      Qing WALTON  [facility] Vascular Surgery

## 2023-05-30 ENCOUNTER — HOSPITAL ENCOUNTER (OUTPATIENT)
Dept: CT IMAGING | Facility: HOSPITAL | Age: 58
Discharge: HOME | End: 2023-05-30
Admitting: NURSE PRACTITIONER

## 2023-05-30 DIAGNOSIS — I87.323 CHRONIC VENOUS HYPERTENSION (IDIOPATHIC) WITH INFLAMMATION OF BILATERAL LOWER EXTREMITY: ICD-10-CM

## 2023-05-30 DIAGNOSIS — I86.8 VARICOSE VEINS OF OTHER SPECIFIED SITES: ICD-10-CM

## 2023-05-30 DIAGNOSIS — I87.2 CHRONIC VENOUS INSUFFICIENCY: ICD-10-CM

## 2023-05-30 LAB
CREAT BLDA-MCNC: 1.1 MG/DL
EGFRCR SERPLBLD CKD-EPI 2021: 79.3 ML/MIN/1.73

## 2023-05-30 PROCEDURE — 82565 ASSAY OF CREATININE: CPT

## 2023-05-30 PROCEDURE — 74174 CTA ABD&PLVS W/CONTRAST: CPT

## 2023-05-30 PROCEDURE — 25510000001 IOPAMIDOL PER 1 ML: Performed by: NURSE PRACTITIONER

## 2023-05-30 RX ADMIN — IOPAMIDOL 100 ML: 755 INJECTION, SOLUTION INTRAVENOUS at 17:58

## 2023-09-18 ENCOUNTER — OFFICE VISIT (OUTPATIENT)
Dept: CARDIOLOGY | Facility: CLINIC | Age: 58
End: 2023-09-18
Payer: MEDICARE

## 2023-09-18 VITALS
WEIGHT: 205.2 LBS | HEIGHT: 70 IN | BODY MASS INDEX: 29.38 KG/M2 | SYSTOLIC BLOOD PRESSURE: 142 MMHG | HEART RATE: 97 BPM | DIASTOLIC BLOOD PRESSURE: 94 MMHG

## 2023-09-18 DIAGNOSIS — R60.0 EDEMA LEG: ICD-10-CM

## 2023-09-18 DIAGNOSIS — I35.1 NONRHEUMATIC AORTIC VALVE INSUFFICIENCY: ICD-10-CM

## 2023-09-18 DIAGNOSIS — I10 PRIMARY HYPERTENSION: Primary | ICD-10-CM

## 2023-09-18 PROCEDURE — 99204 OFFICE O/P NEW MOD 45 MIN: CPT | Performed by: INTERNAL MEDICINE

## 2023-09-18 NOTE — PROGRESS NOTES
"Chief Complaint  Establish Care    Subjective        Nick Resendez presents to [facility] CARDIOLOGY  History of present illness:    Patient is a 58-year-old male with past medical history significant for hypertension and apparent COPD.  The patient was having swelling in his legs worse in the left leg than the right leg.  He was sent to a vascular surgeon.  He had an ultrasound of the IVC that was suboptimal but appeared to show patency of the IVC and common iliac veins.  He had further ultrasound of the legs that showed superficial vein valve problems in the left superficial vein junction.  He does note that his edema has improved.  He takes the Lasix most days although sometimes he forgets.  He does not watch his salt very closely.  He did have an echocardiogram done in the past that showed \"at least moderate aortic insufficiency.\"  He does remain active with no chest pain.  He denies any palpitations.  He stopped smoking 3 months ago.  He notes remote alcohol.  Brother has a history of stents.      Past Medical History:   Diagnosis Date    Anxiety     Bulging lumbar disc     Bulging of thoracic intervertebral disc     Edema of both lower extremities due to peripheral venous insufficiency     +4 edema bilateral legs and feet    Folliculitis     Hyperlipidemia     Hypertension     PONV (postoperative nausea and vomiting)     Ventral hernia          Past Surgical History:   Procedure Laterality Date    FINGER SURGERY      HAND SURGERY      INGUINAL HERNIA REPAIR      NASAL SEPTUM SURGERY      UMBILICAL HERNIA REPAIR      VENTRAL HERNIA REPAIR N/A 2/10/2023    Procedure: VENTRAL / INCISIONAL HERNIA REPAIR LAPAROSCOPIC WITH DAVINCI ROBOT with mesh possible open;  Surgeon: Preet Cruz MD;  Location: [facility] OR [facility] [place];  Service: Robotics - DaVinci;  Laterality: N/A;          Social History     Socioeconomic History    Marital status:    Tobacco Use    Smoking status: Former     " "Packs/day: 0.50     Types: Cigarettes    Smokeless tobacco: Never    Tobacco comments:     12 per day   Vaping Use    Vaping Use: Former    Substances: Nicotine    Devices: Disposable   Substance and Sexual Activity    Alcohol use: Never    Drug use: Never    Sexual activity: Defer         History reviewed. No pertinent family history.       Allergies   Allergen Reactions    Azithromycin Nausea Only    Sulfa Antibiotics Nausea Only            Current Outpatient Medications:     ascorbic acid (VITAMIN C) 1000 MG tablet, Take 1 tablet by mouth Daily., Disp: , Rfl:     aspirin 81 MG chewable tablet, Chew 1 tablet Daily. [facility] last dose 22, Disp: , Rfl:     Biotin 1000 MCG tablet, Take 1,000 mcg by mouth 3 (Three) Times a Day., Disp: , Rfl:     cetirizine (zyrTEC) 10 MG tablet, Take 1 tablet by mouth Daily., Disp: , Rfl:     ketoconazole (NIZORAL) 2 % cream, SMARTSI Application Topical 1 to 2 Times Daily, Disp: , Rfl:     ketoconazole (NIZORAL) 2 % shampoo, , Disp: , Rfl:     lisinopril (PRINIVIL,ZESTRIL) 40 MG tablet, Take 1 tablet by mouth Daily., Disp: , Rfl:     polyethylene glycol (MIRALAX) 17 g packet, Take 17 g by mouth Daily., Disp: 5 packet, Rfl: 0    Potassium 75 MG tablet, Take 650 mg by mouth., Disp: , Rfl:     varenicline (CHANTIX) 1 MG tablet, Take 1 tablet by mouth 2 (Two) Times a Day., Disp: , Rfl:     vitamin B-12 (CYANOCOBALAMIN) 100 MCG tablet, Take 1 tablet by mouth Daily., Disp: , Rfl:       ROS:  Cardiac review of systems positive for edema left greater than right leg.    Objective     /94   Pulse 97   Ht 177.8 cm (70\")   Wt 93.1 kg (205 lb 3.2 oz)   BMI 29.44 kg/m²       General Appearance:   well developed  well nourished  HENT:   oropharynx moist  lips not cyanotic  Respiratory:  no respiratory distress  normal breath sounds  no rales  Cardiovascular:  no jugular venous distention  regular rhythm  S1 normal, S2 normal  no S3, no S4   no murmur  no rub, no thrill  No " carotid bruit  pedal pulses normal  lower extremity edema: none    Musculoskeletal:  no clubbing of fingers.   normocephalic, head atraumatic  Skin:   warm, dry  Psychiatric:  judgement and insight appropriate  normal mood and affect    ECHO:    STRESS:    CATH:  No results found for this or any previous visit.    BMP:     Glucose   Date Value Ref Range Status   05/06/2022 83 65 - 99 mg/dL Final     BUN   Date Value Ref Range Status   04/11/2023 21 7 - 25 mg/dL Final     Creatinine   Date Value Ref Range Status   05/30/2023 1.10 mg/dL Final     Comment:     Serial Number: [id number]:  [id number]     Sodium   Date Value Ref Range Status   04/11/2023 140 136 - 145 mmol/L Final     Potassium   Date Value Ref Range Status   04/11/2023 4.4 3.5 - 5.1 mmol/L Final     Chloride   Date Value Ref Range Status   04/11/2023 98 98 - 107 mmol/L Final     Total CO2   Date Value Ref Range Status   04/11/2023 32 (H) 21 - 31 mmol/L Final     Calcium   Date Value Ref Range Status   04/11/2023 10.0 8.6 - 10.3 mg/dL Final     BUN/Creatinine Ratio   Date Value Ref Range Status   05/06/2022 12.8 7.0 - 25.0 Final     Anion Gap   Date Value Ref Range Status   04/11/2023 10 4 - 12 mmol/L Final     eGFR   Date Value Ref Range Status   05/30/2023 79.3 >60.0 mL/min/1.73 Final     LIPIDS:  Triglycerides   Date Value Ref Range Status   04/11/2023 246 (H) 10 - 150 mg/dL Final     HDL Cholesterol   Date Value Ref Range Status   04/11/2023 43 23 - 92 mg/dL Final     LDL Cholesterol    Date Value Ref Range Status   04/11/2023 111 mg/dL Final     Comment:         OPTIMAL: <100 mg/dl  LOW RISK: 100-129 mg/dl  BORDERLINE HIGH: 130-159 mg/dl  HIGH: 160-189 mg/dl  VERY HIGH: >189 mg/dl     VLDL Cholesterol   Date Value Ref Range Status   04/22/2021 37 5 - 37 mg/dL Final         Procedures             ASSESSMENT:  Diagnoses and all orders for this visit:    1. Primary hypertension (Primary)    2. Edema leg    3. Nonrheumatic aortic valve insufficiency  -    "  Adult Transthoracic Echo Complete W/ Cont if Necessary Per Protocol; Future         PLAN:    1.  Patient's edema does sound most consistent with vein issues.  He does have significant varicosities in his legs.  I have asked him to keep his feet elevated when he can, watch his salt much closer and call us if his swelling gets worse.  At that time we could consider either adding hydrochlorothiazide to his lisinopril or increasing his Lasix.  2.  Would like to recheck an echocardiogram on the patient as he had one a little over 2 years ago that showed \"at least moderate aortic insufficiency.\"  3.  Blood pressure is borderline.  We will continue to monitor but if it stays elevated I would consider adding a little hydrochlorothiazide to his lisinopril.  4.  Patient has not smoked in 3 months.  5.  Patient had cholesterol checked 4/11/2023 with , HDL 43, and triglycerides 246.  We will just continue diet and exercise.      Return in about 2 months (around 11/18/2023) for lennie lynn.     Patient was given instructions and counseling regarding his condition or for health maintenance advice. Please see specific information pulled into the AVS if appropriate.         Yoandy Walsh MD   9/18/2023  14:32 EDT  "

## 2023-10-05 ENCOUNTER — HOSPITAL ENCOUNTER (OUTPATIENT)
Dept: CARDIOLOGY | Facility: HOSPITAL | Age: 58
Discharge: HOME | End: 2023-10-05
Admitting: INTERNAL MEDICINE
Payer: MEDICARE

## 2023-10-05 DIAGNOSIS — I35.1 NONRHEUMATIC AORTIC VALVE INSUFFICIENCY: ICD-10-CM

## 2023-10-05 LAB
BH CV ECHO MEAS - AO MAX PG: 11 MMHG
BH CV ECHO MEAS - AO MEAN PG: 6 MMHG
BH CV ECHO MEAS - AO ROOT DIAM: 2.7 CM
BH CV ECHO MEAS - AO V2 MAX: 165 CM/SEC
BH CV ECHO MEAS - AO V2 VTI: 33.6 CM
BH CV ECHO MEAS - AVA(I,D): 2.9 CM2
BH CV ECHO MEAS - EDV(CUBED): 79.5 ML
BH CV ECHO MEAS - EDV(MOD-SP2): 78.5 ML
BH CV ECHO MEAS - EDV(MOD-SP4): 83.7 ML
BH CV ECHO MEAS - EF(MOD-BP): 61 %
BH CV ECHO MEAS - EF(MOD-SP2): 60.6 %
BH CV ECHO MEAS - EF(MOD-SP4): 60.8 %
BH CV ECHO MEAS - ESV(CUBED): 24.4 ML
BH CV ECHO MEAS - ESV(MOD-SP2): 30.9 ML
BH CV ECHO MEAS - ESV(MOD-SP4): 32.8 ML
BH CV ECHO MEAS - FS: 32.6 %
BH CV ECHO MEAS - IVS/LVPW: 1.2 CM
BH CV ECHO MEAS - IVSD: 1.2 CM
BH CV ECHO MEAS - LA DIMENSION: 3.3 CM
BH CV ECHO MEAS - LAT PEAK E' VEL: 8.7 CM/SEC
BH CV ECHO MEAS - LV MASS(C)D: 162.9 GRAMS
BH CV ECHO MEAS - LV MAX PG: 5.6 MMHG
BH CV ECHO MEAS - LV MEAN PG: 3 MMHG
BH CV ECHO MEAS - LV V1 MAX: 118 CM/SEC
BH CV ECHO MEAS - LV V1 VTI: 21.4 CM
BH CV ECHO MEAS - LVIDD: 4.3 CM
BH CV ECHO MEAS - LVIDS: 2.9 CM
BH CV ECHO MEAS - LVOT AREA: 4.5 CM2
BH CV ECHO MEAS - LVOT DIAM: 2.4 CM
BH CV ECHO MEAS - LVPWD: 1 CM
BH CV ECHO MEAS - MED PEAK E' VEL: 8.4 CM/SEC
BH CV ECHO MEAS - MV A MAX VEL: 118 CM/SEC
BH CV ECHO MEAS - MV DEC SLOPE: 956 CM/SEC2
BH CV ECHO MEAS - MV DEC TIME: 0.17 SEC
BH CV ECHO MEAS - MV E MAX VEL: 83.7 CM/SEC
BH CV ECHO MEAS - MV E/A: 0.71
BH CV ECHO MEAS - MV MAX PG: 8 MMHG
BH CV ECHO MEAS - MV MEAN PG: 4 MMHG
BH CV ECHO MEAS - MV P1/2T: 44.7 MSEC
BH CV ECHO MEAS - MV V2 VTI: 38.1 CM
BH CV ECHO MEAS - MVA(P1/2T): 4.9 CM2
BH CV ECHO MEAS - MVA(VTI): 2.5 CM2
BH CV ECHO MEAS - RVDD: 2.5 CM
BH CV ECHO MEAS - SV(LVOT): 96.8 ML
BH CV ECHO MEAS - SV(MOD-SP2): 47.6 ML
BH CV ECHO MEAS - SV(MOD-SP4): 50.9 ML
BH CV ECHO MEASUREMENTS AVERAGE E/E' RATIO: 9.79
IVRT: 77 MS
PISA AR MAX VEL: 509 M/S

## 2023-10-05 PROCEDURE — 93306 TTE W/DOPPLER COMPLETE: CPT

## 2023-10-06 NOTE — PROGRESS NOTES
Notify patient the results of his  ultrasound of his  heart was normal.  The heart is functioning well and there were no structural or valvular abnormalities.

## 2023-11-20 ENCOUNTER — OFFICE VISIT (OUTPATIENT)
Dept: CARDIOLOGY | Facility: CLINIC | Age: 58
End: 2023-11-20
Payer: MEDICARE

## 2023-11-20 VITALS
WEIGHT: 210.4 LBS | SYSTOLIC BLOOD PRESSURE: 159 MMHG | HEART RATE: 108 BPM | HEIGHT: 70 IN | DIASTOLIC BLOOD PRESSURE: 98 MMHG | BODY MASS INDEX: 30.12 KG/M2

## 2023-11-20 DIAGNOSIS — I35.1 NONRHEUMATIC AORTIC VALVE INSUFFICIENCY: Primary | ICD-10-CM

## 2023-11-20 DIAGNOSIS — I87.2 CHRONIC VENOUS INSUFFICIENCY: ICD-10-CM

## 2023-11-20 DIAGNOSIS — I10 PRIMARY HYPERTENSION: ICD-10-CM

## 2023-11-20 PROCEDURE — 1160F RVW MEDS BY RX/DR IN RCRD: CPT

## 2023-11-20 PROCEDURE — 1159F MED LIST DOCD IN RCRD: CPT

## 2023-11-20 PROCEDURE — 99214 OFFICE O/P EST MOD 30 MIN: CPT

## 2023-11-20 RX ORDER — DULOXETIN HYDROCHLORIDE 60 MG/1
60 CAPSULE, DELAYED RELEASE ORAL DAILY
COMMUNITY
Start: 2023-09-25 | End: 2023-11-20

## 2023-11-20 RX ORDER — HYDROCHLOROTHIAZIDE 12.5 MG/1
12.5 CAPSULE, GELATIN COATED ORAL DAILY
Qty: 90 CAPSULE | Refills: 3 | Status: SHIPPED | OUTPATIENT
Start: 2023-11-20

## 2023-11-20 RX ORDER — FUROSEMIDE 20 MG/1
20 TABLET ORAL 2 TIMES DAILY
COMMUNITY
Start: 2023-11-06

## 2023-11-20 RX ORDER — FLUTICASONE PROPIONATE 50 MCG
2 SPRAY, SUSPENSION (ML) NASAL DAILY
COMMUNITY
Start: 2023-09-26

## 2023-11-20 NOTE — PROGRESS NOTES
Chief Complaint  Primary hypertension and Follow-up (2 mo f/u.  ECHO complete)    Subjective        History of Present Illness  Nick Resendez presents to [facility] CARDIOLOG[facility] for follow up.  Patient is a 58-year-old male with past medical history outlined below, significant for hypertension, hyperlipidemia and aortic valve regurgitation who presents for follow-up.  He feels good for the most part.  He has no chest pain or discomfort or shortness of breath.  His only complaint is generalized fatigue which he attributes to low testosterone.  He was encouraged to discuss this with his PCP.  He denies any dizziness, lightheadedness or palpitations.  He does note that his blood pressure has been running high at home.      Past Medical History:   Diagnosis Date    Anxiety     Bulging lumbar disc     Bulging of thoracic intervertebral disc     Edema of both lower extremities due to peripheral venous insufficiency     +4 edema bilateral legs and feet    Folliculitis     Heart valve disease 1979    I was in [employer]    Hyperlipidemia     Hypertension     PONV (postoperative nausea and vomiting)     Ventral hernia        ALLERGY  Allergies   Allergen Reactions    Azithromycin Nausea Only    Sulfa Antibiotics Nausea Only        Past Surgical History:   Procedure Laterality Date    FINGER SURGERY      HAND SURGERY      INGUINAL HERNIA REPAIR      NASAL SEPTUM SURGERY      UMBILICAL HERNIA REPAIR      VENTRAL HERNIA REPAIR N/A 02/10/2023    Procedure: VENTRAL / INCISIONAL HERNIA REPAIR LAPAROSCOPIC WITH DAVINCI ROBOT with mesh possible open;  Surgeon: Preet Cruz MD;  [place] [facility] [place] [facility] [place]  Service: Robotics - DaVinci;  Laterality: N/A;        Social History     Socioeconomic History    Marital status:    Tobacco Use    Smoking status: Former     Packs/day: 1.50     Years: 10.00     Additional pack years: 0.00     Total pack years: 15.00     Types: Cigarettes     Start date:  2012     Quit date: 2023     Years since quittin.4    Smokeless tobacco: Never    Tobacco comments:     I quit twice before thiu time i hadnt smoked for 16 years   Vaping Use    Vaping Use: Some days    Substances: Nicotine    Devices: Disposable   Substance and Sexual Activity    Alcohol use: Not Currently     Comment: Dont drink now for past 15 years    Drug use: Not Currently     Types: Amphetamines, Cocaine(coke), Fentanyl, Hashish, Hydrocodone, LSD, Marijuana, MDMA (ecstacy), Mescaline, Morphine, Oxycodone, PCP, Solvent inhalants     Comment: I no longer do all these i have in the past    Sexual activity: Yes     Partners: Female     Comment: I have had a vasectomy       Family History   Problem Relation Age of Onset    Heart attack Mother         Has urithmia of the heart    Heart attack Paternal Grandfather          from congestive heart failure    Heart attack Maternal Aunt          of cogestive heart failure    Heart attack Paternal Uncle         Has had a quadruple bypass    Heart attack Brother         Just had stints put in arteries around his heart        Current Outpatient Medications on File Prior to Visit   Medication Sig    ascorbic acid (VITAMIN C) 1000 MG tablet Take 1 tablet by mouth Daily.    aspirin 81 MG chewable tablet Chew 1 tablet Daily. Heart Health last dose 22    Biotin 1000 MCG tablet Take 1,000 mcg by mouth Daily.    cetirizine (zyrTEC) 10 MG tablet Take 1 tablet by mouth Daily.    fluticasone (FLONASE) 50 MCG/ACT nasal spray 2 sprays into the nostril(s) as directed by provider Daily.    furosemide (LASIX) 20 MG tablet Take 1 tablet by mouth 2 (Two) Times a Day.    ketoconazole (NIZORAL) 2 % cream SMARTSI Application Topical 1 to 2 Times Daily    ketoconazole (NIZORAL) 2 % shampoo     lisinopril (PRINIVIL,ZESTRIL) 40 MG tablet Take 1 tablet by mouth Daily.    polyethylene glycol (MIRALAX) 17 g packet Take 17 g by mouth Daily.    Potassium 75 MG tablet  "Take 650 mg by mouth As Needed.    varenicline (CHANTIX) 1 MG tablet Take 1 tablet by mouth 2 (Two) Times a Day.    vitamin B-12 (CYANOCOBALAMIN) 100 MCG tablet Take 1 tablet by mouth Daily.    vitamin D3 125 MCG (5000 UT) capsule capsule Take 1 capsule by mouth Daily.    [DISCONTINUED] DULoxetine (CYMBALTA) 60 MG capsule Take 1 capsule by mouth Daily. (Patient not taking: Reported on 11/20/2023)     No current facility-administered medications on file prior to visit.       Objective   Vitals:    11/20/23 1408   BP: 159/98   Pulse: 108   Weight: 95.4 kg (210 lb 6.4 oz)   Height: 177.8 cm (70\")       Physical Exam  Constitutional:       General: He is awake. He is not in acute distress.     Appearance: Normal appearance.   HENT:      Head: Normocephalic.      Nose: Nose normal. No congestion.   Eyes:      Extraocular Movements: Extraocular movements intact.      Conjunctiva/sclera: Conjunctivae normal.      Pupils: Pupils are equal, round, and reactive to light.   Neck:      Thyroid: No thyromegaly.      Vascular: No JVD.   Cardiovascular:      Rate and Rhythm: Normal rate and regular rhythm.      Chest Wall: PMI is not displaced.      Pulses: Normal pulses.      Heart sounds: Normal heart sounds, S1 normal and S2 normal. No murmur heard.     No friction rub. No gallop. No S3 or S4 sounds.   Pulmonary:      Effort: Pulmonary effort is normal.      Breath sounds: Normal breath sounds. No wheezing, rhonchi or rales.   Abdominal:      General: Bowel sounds are normal.      Palpations: Abdomen is soft.      Tenderness: There is no abdominal tenderness.   Musculoskeletal:      Cervical back: No tenderness.      Right lower leg: No edema.      Left lower leg: No edema.   Lymphadenopathy:      Cervical: No cervical adenopathy.   Skin:     General: Skin is warm and dry.      Capillary Refill: Capillary refill takes less than 2 seconds.      Coloration: Skin is not cyanotic.      Findings: No petechiae or rash.      Nails: " "There is no clubbing.   Neurological:      Mental Status: He is alert.   Psychiatric:         Mood and Affect: Mood normal.         Behavior: Behavior is cooperative.           Result Review     The following data was reviewed by ANTON Loredo on 11/20/23.    No results found for: \"PROBNP\"  CMP          4/11/2023    21:50 5/30/2023    17:40 9/25/2023    21:38   CMP   Glucose 83      124       BUN 21      19       Creatinine 1.1     1.10  1.1       EGFR  79.3     Sodium 140      138       Potassium 4.4      3.9       Chloride 98      101       Calcium 10.0      8.9       Albumin 4.7      4.1       Total Bilirubin 0.49      0.46       Alkaline Phosphatase 70      60       AST (SGOT) 28      26       ALT (SGPT) 28      25       Anion Gap 10      11          Details          This result is from an external source.                Lipid Panel          4/11/2023    21:50   Lipid Panel   Total Cholesterol 203       Triglycerides 246       HDL Cholesterol 43       LDL Cholesterol  111          Details          This result is from an external source.               Results for orders placed during the hospital encounter of 10/05/23    Adult Transthoracic Echo Complete W/ Cont if Necessary Per Protocol    Interpretation Summary    Technically difficult study.    Left ventricular ejection fraction appears to be 56 - 60%.  No obvious regional wall motion abnormalities.    Left ventricular diastolic function is consistent with (grade I) impaired relaxation.    Aortic valve is not well visualized.  There is mild to moderate aortic insufficiency.  No significant aortic stenosis.           Procedures    Assessment & Plan  Diagnoses and all orders for this visit:    1. Nonrheumatic aortic valve insufficiency (Primary)    2. Primary hypertension    3. Chronic venous insufficiency    Other orders  -     hydroCHLOROthiazide (MICROZIDE) 12.5 MG capsule; Take 1 capsule by mouth Daily.  Dispense: 90 capsule; Refill: 3    1.  Mild " to moderate aortic insufficiency.  No aortic stenosis on recent echocardiogram.  Unchanged from previous echo.  He is asymptomatic for the most part.  We will continue to monitor clinically.  2.  Blood pressure is elevated in the office today and he notes elevated blood pressure readings at home.  Continue lisinopril 40 mg daily.  Add hydrochlorothiazide 12.5 mg daily.  3.  Lower extremity edema has improved.  He has no edema on exam today.Continue to monitor.  Likely related to venous insufficiency.    Follow Up   Return in about 6 months (around 5/20/2024) for With Dr. Walsh.    Patient was given instructions and counseling regarding his condition or for health maintenance advice. Please see specific information pulled into the AVS if appropriate.     Valerie Mejias, APRN  11/20/23  14:24 EST    Dictated Utilizing Dragon Dictation

## 2024-05-13 ENCOUNTER — OFFICE VISIT (OUTPATIENT)
Dept: CARDIOLOGY | Facility: CLINIC | Age: 59
End: 2024-05-13
Payer: MEDICARE

## 2024-05-13 VITALS
DIASTOLIC BLOOD PRESSURE: 102 MMHG | SYSTOLIC BLOOD PRESSURE: 153 MMHG | HEART RATE: 103 BPM | WEIGHT: 200 LBS | BODY MASS INDEX: 28.63 KG/M2 | HEIGHT: 70 IN

## 2024-05-13 DIAGNOSIS — I35.1 NONRHEUMATIC AORTIC VALVE INSUFFICIENCY: Primary | ICD-10-CM

## 2024-05-13 DIAGNOSIS — I10 PRIMARY HYPERTENSION: ICD-10-CM

## 2024-05-13 PROCEDURE — 1159F MED LIST DOCD IN RCRD: CPT

## 2024-05-13 PROCEDURE — 99214 OFFICE O/P EST MOD 30 MIN: CPT

## 2024-05-13 PROCEDURE — 1160F RVW MEDS BY RX/DR IN RCRD: CPT

## 2024-05-13 PROCEDURE — G2211 COMPLEX E/M VISIT ADD ON: HCPCS

## 2024-05-13 RX ORDER — HYDROCHLOROTHIAZIDE 12.5 MG/1
25 CAPSULE, GELATIN COATED ORAL DAILY
Qty: 90 CAPSULE | Refills: 3 | Status: SHIPPED | OUTPATIENT
Start: 2024-05-13

## 2024-05-13 NOTE — PROGRESS NOTES
Chief Complaint  Nonrheumatic aortic valve insufficiency and Follow-up (6 mo f/u. )    Subjective        History of Present Illness  Nick Resendez presents to [facility]IOLOG[facility] for follow up.   Patient Is a 58-year-old male patient is a 58-year-old male with past medical history outlined below, significant for aortic valve insufficiency, hypertension, hyperlipidemia who presents for follow-up. he feels good.  He has no complaints or concerns today.  He does note that his blood pressure continues to run high at home.  He denies any chest pain or discomfort, dyspnea, orthopnea, edema or syncope.      Past Medical History:   Diagnosis Date    Anxiety     Bulging lumbar disc     Bulging of thoracic intervertebral disc     Edema of both lower extremities due to peripheral venous insufficiency     +4 edema bilateral legs and feet    Folliculitis     Heart valve disease     [facility]    Hyperlipidemia     Hypertension     PONV (postoperative nausea and vomiting)     Ventral hernia        ALLERGY  Allergies   Allergen Reactions    Azithromycin Nausea Only    Sulfa Antibiotics Nausea Only        Past Surgical History:   Procedure Laterality Date    FINGER SURGERY      HAND SURGERY      INGUINAL HERNIA REPAIR      NASAL SEPTUM SURGERY      UMBILICAL HERNIA REPAIR      VENTRAL HERNIA REPAIR N/A 02/10/2023    Procedure: VENTRAL / INCISIONAL HERNIA REPAIR LAPAROSCOPIC WITH DAVINCI ROBOT with mesh possible open;  Surgeon: Preet Cruz MD;  [place] [facility] [place] [facility] [place]  Service: Robotics - DaVinci;  Laterality: N/A;        Social History     Socioeconomic History    Marital status:    Tobacco Use    Smoking status: Some Days     Current packs/day: 0.00     Average packs/day: 1.5 packs/day for 11.0 years (16.5 ttl pk-yrs)     Types: Cigarettes     Start date: 2012     Last attempt to quit: 2023     Years since quittin.9    Smokeless tobacco: Never    Tobacco  comments:     I quit twice before thiu time i hadnt smoked for 16 years   Vaping Use    Vaping status: Some Days    Substances: Nicotine    Devices: Disposable   Substance and Sexual Activity    Alcohol use: Not Currently     Comment: Dont drink now for past 15 years    Drug use: Not Currently     Types: Amphetamines, Cocaine(coke), Fentanyl, Hashish, Hydrocodone, LSD, Marijuana, MDMA (ecstacy), Mescaline, Morphine, Oxycodone, PCP, Solvent inhalants     Comment: I no longer do all these i have in the past    Sexual activity: Yes     Partners: Female     Comment: I have had a vasectomy       Family History   Problem Relation Age of Onset    Heart attack Mother         Has urithmia of the heart    Heart attack Paternal Grandfather          from congestive heart failure    Heart attack Maternal Aunt          of cogestive heart failure    Heart attack Paternal Uncle         Has had a quadruple bypass    Heart attack Brother         Just had stints put in arteries around his heart    Hypertension Brother     Heart failure Maternal Grandmother         Current Outpatient Medications on File Prior to Visit   Medication Sig    ascorbic acid (VITAMIN C) 1000 MG tablet Take 1 tablet by mouth Daily.    Biotin 1000 MCG tablet Take 1,000 mcg by mouth Daily.    cetirizine (zyrTEC) 10 MG tablet Take 1 tablet by mouth Daily.    fluticasone (FLONASE) 50 MCG/ACT nasal spray 2 sprays into the nostril(s) as directed by provider Daily.    furosemide (LASIX) 20 MG tablet Take 1 tablet by mouth 2 (Two) Times a Day.    ketoconazole (NIZORAL) 2 % cream SMARTSI Application Topical 1 to 2 Times Daily    lisinopril (PRINIVIL,ZESTRIL) 40 MG tablet Take 1 tablet by mouth Daily.    polyethylene glycol (MIRALAX) 17 g packet Take 17 g by mouth Daily.    varenicline (CHANTIX) 1 MG tablet Take 1 tablet by mouth 2 (Two) Times a Day.    vitamin B-12 (CYANOCOBALAMIN) 100 MCG tablet Take 1 tablet by mouth Daily.    vitamin D3 125 MCG (5000 UT)  "capsule capsule Take 1 capsule by mouth Daily.    [DISCONTINUED] hydroCHLOROthiazide (MICROZIDE) 12.5 MG capsule Take 1 capsule by mouth Daily.    [DISCONTINUED] ketoconazole (NIZORAL) 2 % shampoo      No current facility-administered medications on file prior to visit.       Objective   Vitals:    05/13/24 1511   BP: (!) 153/102   Pulse: 103   Weight: 90.7 kg (200 lb)   Height: 177.8 cm (70\")       Physical Exam  Constitutional:       General: He is awake. He is not in acute distress.     Appearance: Normal appearance.   HENT:      Head: Normocephalic.      Nose: Nose normal. No congestion.   Eyes:      Extraocular Movements: Extraocular movements intact.      Conjunctiva/sclera: Conjunctivae normal.      Pupils: Pupils are equal, round, and reactive to light.   Neck:      Thyroid: No thyromegaly.      Vascular: No JVD.   Cardiovascular:      Rate and Rhythm: Normal rate and regular rhythm.      Chest Wall: PMI is not displaced.      Pulses: Normal pulses.      Heart sounds: Normal heart sounds, S1 normal and S2 normal. No murmur heard.     No friction rub. No gallop. No S3 or S4 sounds.   Pulmonary:      Effort: Pulmonary effort is normal.      Breath sounds: Normal breath sounds. No wheezing, rhonchi or rales.   Abdominal:      General: Bowel sounds are normal.      Palpations: Abdomen is soft.      Tenderness: There is no abdominal tenderness.   Musculoskeletal:      Cervical back: No tenderness.      Right lower leg: No edema.      Left lower leg: No edema.   Lymphadenopathy:      Cervical: No cervical adenopathy.   Skin:     General: Skin is warm and dry.      Capillary Refill: Capillary refill takes less than 2 seconds.      Coloration: Skin is not cyanotic.      Findings: No petechiae or rash.      Nails: There is no clubbing.   Neurological:      Mental Status: He is alert.   Psychiatric:         Mood and Affect: Mood normal.         Behavior: Behavior is cooperative.           Result Review     The " following data was reviewed by ANTON Loredo on 05/13/24.      CMP          5/30/2023    17:40 9/25/2023    21:38 4/15/2024    09:11   CMP   Glucose  124     77       BUN  19     18       Creatinine 1.10  1.1     1.0       EGFR 79.3      Sodium  138     138       Potassium  3.9     4.0       Chloride  101     101       Calcium  8.9     9.4       Albumin  4.1     4.4       Total Bilirubin  0.46     0.47       Alkaline Phosphatase  60     71       AST (SGOT)  26     27       ALT (SGPT)  25     26       Anion Gap  11     9          Details          This result is from an external source.                Lipid Panel          4/15/2024    09:11   Lipid Panel   Total Cholesterol 178       Triglycerides 268       HDL Cholesterol 38       LDL Cholesterol  86          Details          This result is from an external source.               Results for orders placed during the hospital encounter of 10/05/23    Adult Transthoracic Echo Complete W/ Cont if Necessary Per Protocol    Interpretation Summary    Technically difficult study.    Left ventricular ejection fraction appears to be 56 - 60%.  No obvious regional wall motion abnormalities.    Left ventricular diastolic function is consistent with (grade I) impaired relaxation.    Aortic valve is not well visualized.  There is mild to moderate aortic insufficiency.  No significant aortic stenosis.      No results found for this or any previous visit.          Procedures    Assessment & Plan  Diagnoses and all orders for this visit:    1. Nonrheumatic aortic valve insufficiency (Primary)    2. Primary hypertension    Other orders  -     hydroCHLOROthiazide (MICROZIDE) 12.5 MG capsule; Take 2 capsules by mouth Daily.  Dispense: 90 capsule; Refill: 3      1.  Mild to moderate on most recent echocardiogram.  Patient is asymptomatic.  Will continue to monitor clinically.  May consider repeat echocardiogram at next follow-up visit.    2.  Blood pressure is elevated in the  office today and he notes elevated readings at home.  Hydrochlorothiazide will be increased to 25 mg daily.        The medical services provided during this encounter are part of ongoing care related to this patient's single serious condition or complex condition.      Follow Up   Return in about 6 months (around 11/13/2024) for With ANTON Loera.    Patient was given instructions and counseling regarding his condition or for health maintenance advice. Please see specific information pulled into the AVS if appropriate.     ANTON Loredo  05/13/24  15:31 EDT    Dictated Utilizing Dragon Dictation

## (undated) DEVICE — ELECTRD BLD EDGE COAT 3IN

## (undated) DEVICE — ARM DRAPE

## (undated) DEVICE — SLV SCD KN/LEN ADJ EXPRSS BLENDED MD 1P/U

## (undated) DEVICE — STERILE POLYISOPRENE POWDER-FREE SURGICAL GLOVES: Brand: PROTEXIS

## (undated) DEVICE — SUT VIC PLS CTD BR 0 TIE 18IN VIL

## (undated) DEVICE — TIP COVER ACCESSORY

## (undated) DEVICE — STERILE POLYISOPRENE POWDER-FREE SURGICAL GLOVES WITH EMOLLIENT COATING: Brand: PROTEXIS

## (undated) DEVICE — CANNULA SEAL

## (undated) DEVICE — LAPAROSCOPIC SMOKE EVACUATION SYSTEM ACTIVE AND PASSIVE: Brand: VALLEYLAB

## (undated) DEVICE — ENDOPATH PNEUMONEEDLE INSUFFLATION NEEDLES WITH LUER LOCK CONNECTORS 120MM: Brand: ENDOPATH

## (undated) DEVICE — GLV SURG BIOGEL LTX PF 7 1/2

## (undated) DEVICE — ABDOMINAL BINDER, ILIO BYPASS: Brand: DEROYAL

## (undated) DEVICE — SYS CLOSE PORTII CARTR/THOMASN XL

## (undated) DEVICE — DRSNG WND GZ CURAD OIL EMULSION 3X3IN STRL

## (undated) DEVICE — DAVINCI-LF: Brand: MEDLINE INDUSTRIES, INC.

## (undated) DEVICE — PAD GRND REM POLYHESIVE A/ DISP

## (undated) DEVICE — SYR LUERLOK 30CC

## (undated) DEVICE — SUT MNCRYL PLS ANTIB UD 4/0 PS2 18IN

## (undated) DEVICE — 3M™ IOBAN™ 2 ANTIMICROBIAL INCISE DRAPE 6650EZ: Brand: IOBAN™ 2

## (undated) DEVICE — LAPAROVUE VISIBILITY SYSTEM LAPAROSCOPIC SOLUTIONS: Brand: LAPAROVUE

## (undated) DEVICE — SOL IRR H2O BTL 1000ML STRL

## (undated) DEVICE — INTENDED FOR TISSUE SEPARATION, AND OTHER PROCEDURES THAT REQUIRE A SHARP SURGICAL BLADE TO PUNCTURE OR CUT.: Brand: BARD-PARKER ® CARBON RIB-BACK BLADES

## (undated) DEVICE — APPL CHLORAPREP HI/LITE 26ML ORNG

## (undated) DEVICE — GLV SURG SENSICARE SLT PF LF 7.5 STRL

## (undated) DEVICE — REDUCER: Brand: ENDOWRIST

## (undated) DEVICE — SEAL

## (undated) DEVICE — BLADELESS OBTURATOR: Brand: WECK VISTA

## (undated) DEVICE — PENCL E/S HNDSWCH ROCKR CB

## (undated) DEVICE — LAPAROSCOPIC SCISSORS: Brand: EPIX LAPAROSCOPIC SCISSORS